# Patient Record
Sex: MALE | Race: WHITE | Employment: OTHER | URBAN - METROPOLITAN AREA
[De-identification: names, ages, dates, MRNs, and addresses within clinical notes are randomized per-mention and may not be internally consistent; named-entity substitution may affect disease eponyms.]

---

## 2018-01-02 LAB
BUN / CREAT RATIO: NORMAL
BUN BLDV-MCNC: NORMAL MG/DL
CREAT SERPL-MCNC: NORMAL MG/DL

## 2019-05-14 LAB
CREATININE, URINE: NORMAL
MICROALBUMIN/CREAT 24H UR: 35.8 MG/G{CREAT}
MICROALBUMIN/CREAT UR-RTO: NORMAL

## 2020-05-21 LAB
ALBUMIN SERPL-MCNC: NORMAL G/DL
ALP BLD-CCNC: NORMAL U/L
ALT SERPL-CCNC: NORMAL U/L
ANION GAP SERPL CALCULATED.3IONS-SCNC: NORMAL MMOL/L
AST SERPL-CCNC: NORMAL U/L
AVERAGE GLUCOSE: NORMAL
BILIRUB SERPL-MCNC: NORMAL MG/DL
BUN BLDV-MCNC: NORMAL MG/DL
CALCIUM SERPL-MCNC: NORMAL MG/DL
CHLORIDE BLD-SCNC: NORMAL MMOL/L
CHOLESTEROL, TOTAL: NORMAL
CHOLESTEROL/HDL RATIO: NORMAL
CO2: NORMAL
CREAT SERPL-MCNC: NORMAL MG/DL
GFR CALCULATED: NORMAL
GLUCOSE BLD-MCNC: NORMAL MG/DL
HBA1C MFR BLD: 9.2 %
HDLC SERPL-MCNC: NORMAL MG/DL
LDL CHOLESTEROL CALCULATED: NORMAL
MICROALBUMIN UR-MCNC: 50.3
POTASSIUM SERPL-SCNC: NORMAL MMOL/L
PROSTATE SPECIFIC ANTIGEN: NORMAL
SODIUM BLD-SCNC: NORMAL MMOL/L
TOTAL PROTEIN: NORMAL
TRIGL SERPL-MCNC: NORMAL MG/DL
VLDLC SERPL CALC-MCNC: NORMAL MG/DL

## 2020-07-13 VITALS
RESPIRATION RATE: 21 BRPM | SYSTOLIC BLOOD PRESSURE: 130 MMHG | HEART RATE: 92 BPM | TEMPERATURE: 98.3 F | WEIGHT: 305.5 LBS | HEIGHT: 70 IN | OXYGEN SATURATION: 91 % | DIASTOLIC BLOOD PRESSURE: 80 MMHG | BODY MASS INDEX: 43.74 KG/M2

## 2020-07-13 RX ORDER — ASPIRIN 81 MG/1
81 TABLET ORAL DAILY
COMMUNITY
End: 2021-04-14 | Stop reason: CLARIF

## 2020-07-13 RX ORDER — ATORVASTATIN CALCIUM 80 MG/1
80 TABLET, FILM COATED ORAL DAILY
COMMUNITY

## 2020-07-13 RX ORDER — INSULIN GLARGINE 100 [IU]/ML
10 INJECTION, SOLUTION SUBCUTANEOUS 2 TIMES DAILY
COMMUNITY
End: 2020-08-27

## 2020-07-13 RX ORDER — DILTIAZEM HYDROCHLORIDE 120 MG/1
120 CAPSULE, COATED, EXTENDED RELEASE ORAL DAILY
COMMUNITY

## 2020-07-13 RX ORDER — NAPROXEN SODIUM 220 MG
440 TABLET ORAL DAILY
COMMUNITY
End: 2021-04-14 | Stop reason: CLARIF

## 2020-07-13 RX ORDER — CARVEDILOL 25 MG/1
25 TABLET ORAL 2 TIMES DAILY
COMMUNITY

## 2020-07-13 RX ORDER — LISINOPRIL 20 MG/1
20 TABLET ORAL 2 TIMES DAILY
COMMUNITY

## 2020-07-13 RX ORDER — FUROSEMIDE 80 MG
40 TABLET ORAL 2 TIMES DAILY
COMMUNITY
End: 2021-04-14 | Stop reason: CLARIF

## 2020-07-22 ENCOUNTER — TELEPHONE (OUTPATIENT)
Dept: PHARMACY | Facility: CLINIC | Age: 70
End: 2020-07-22

## 2020-07-22 NOTE — LETTER
[address]  [address], [address]  Phone: [phone], [address] [place] [address]           07/23/20     Dear Yonis Blackmon,    We tried to reach you recently regarding your lisinopril, but were unable to reach you on the telephone. It appears that this medication has not been filled at proper times. We are worried you might be missing doses or not taking it as directed. It is important that you take your medications regularly and try not to miss a single dose. We have on file that you are currently taking lisinopril 20 mg twice daily. If you are no longer taking it or taking it differently than above, please call us at the number below so that we can discuss this and update your medication profile.     Some ways to help you remember to take and refill your medications are to:  · Use a pill box, set an alarm, and/or keep your medication near something that you do every day  · Fill a 3-month supply of your prescription at a time to save you time and trips to the pharmacy  if you would like assistance in switching your prescriptions to a 3-month supply, please contact us  · Ask your pharmacy if they participate in [facility]", a program where you can  all of your medications on the same day each month  · Ask your pharmacy if you can be set up with automatic refill, where they will automatically refill your prescription when it is due and let you know it's ready to     Sincerely,     Melina Mccauley, PharmD  [address]  Phone: [phone], [address]

## 2020-07-22 NOTE — TELEPHONE ENCOUNTER
CLINICAL PHARMACY: ADHERENCE REVIEW  Identified care gap per [employer]; fills at [facility]: ACE/ARB and Statin adherence    Last Office Visit: unknown - prescriber for both medications is Marlene Bose DO - cardiologist in [place], [place].    ASSESSMENT  ACE/ARB ADHERENCE  (YTD Dean Leahy = filled only once) Potential Fail Date: 2020    Per Insurance Records   Lisinopril 20 mg last filled on 2020 for a 90 day supply. SI tab BID. Per [facility]:   Lisinopril last picked up on 2020 for 90 day supply. There are refills remaining. BP Readings from Last 3 Encounters:   20 130/80     CrCl cannot be calculated (No successful lab value found. ). DIABETES ADHERENCE  PDC: no insurance claims to review - appears patient is prescribed Lantus 10 units qHS. (per scanned discharge instructions from 2020 - on 2020). Lab Results   Component Value Date    LABA1C 9.2 2020     STATIN ADHERENCE  (YTD PDC = 100%)     Per Insurance Records   Atorvastatin 80 mg last filled on 2020 for a 90 day supply. Due to refill on 2020. Per [facility]:   Atorvastatin last picked up on 2020 for 90 day supply. 2 refills remaining. PLAN  Attempting to reach patient to review.  Left message asking for return call. Will attempt to contact the patient again to discuss lisinopril adherence and to make sure he is taking BID as prescribed.      Selina Zabala, DelfinD, [facility]  Direct: [phone]  Department, toll free [phone], [address]

## 2020-08-19 ENCOUNTER — NURSE ONLY (OUTPATIENT)
Dept: FAMILY MEDICINE CLINIC | Age: 70
End: 2020-08-19
Payer: MEDICARE

## 2020-08-19 ENCOUNTER — HOSPITAL ENCOUNTER (OUTPATIENT)
Age: 70
Discharge: HOME | End: 2020-08-21
Payer: MEDICARE

## 2020-08-19 LAB
BASOPHILS ABSOLUTE: 0.1 E9/L (ref 0–0.2)
BASOPHILS RELATIVE PERCENT: 0.9 % (ref 0–2)
CREATININE URINE POCT: NORMAL
EOSINOPHILS ABSOLUTE: 0.43 E9/L (ref 0.05–0.5)
EOSINOPHILS RELATIVE PERCENT: 4.1 % (ref 0–6)
HCT VFR BLD CALC: 45.9 % (ref 37–54)
HEMOGLOBIN: 14.6 G/DL (ref 12.5–16.5)
IMMATURE GRANULOCYTES #: 0.02 E9/L
IMMATURE GRANULOCYTES %: 0.2 % (ref 0–5)
LYMPHOCYTES ABSOLUTE: 2.13 E9/L (ref 1.5–4)
LYMPHOCYTES RELATIVE PERCENT: 20.1 % (ref 20–42)
MCH RBC QN AUTO: 30.2 PG (ref 26–35)
MCHC RBC AUTO-ENTMCNC: 31.8 % (ref 32–34.5)
MCV RBC AUTO: 94.8 FL (ref 80–99.9)
MICROALBUMIN/CREAT 24H UR: NORMAL MG/G{CREAT}
MICROALBUMIN/CREAT UR-RTO: NORMAL
MONOCYTES ABSOLUTE: 0.81 E9/L (ref 0.1–0.95)
MONOCYTES RELATIVE PERCENT: 7.6 % (ref 2–12)
NEUTROPHILS ABSOLUTE: 7.12 E9/L (ref 1.8–7.3)
NEUTROPHILS RELATIVE PERCENT: 67.1 % (ref 43–80)
PDW BLD-RTO: 15.1 FL (ref 11.5–15)
PLATELET # BLD: 265 E9/L (ref 130–450)
PMV BLD AUTO: 11.6 FL (ref 7–12)
RBC # BLD: 4.84 E12/L (ref 3.8–5.8)
WBC # BLD: 10.6 E9/L (ref 4.5–11.5)

## 2020-08-19 PROCEDURE — 83036 HEMOGLOBIN GLYCOSYLATED A1C: CPT

## 2020-08-19 PROCEDURE — 85025 COMPLETE CBC W/AUTO DIFF WBC: CPT

## 2020-08-19 PROCEDURE — 36415 COLL VENOUS BLD VENIPUNCTURE: CPT | Performed by: NURSE PRACTITIONER

## 2020-08-19 PROCEDURE — 80053 COMPREHEN METABOLIC PANEL: CPT

## 2020-08-19 PROCEDURE — 82044 UR ALBUMIN SEMIQUANTITATIVE: CPT | Performed by: NURSE PRACTITIONER

## 2020-08-19 PROCEDURE — 80061 LIPID PANEL: CPT

## 2020-08-20 LAB
ALBUMIN SERPL-MCNC: 4.2 G/DL (ref 3.5–5.2)
ALP BLD-CCNC: 67 U/L (ref 40–129)
ALT SERPL-CCNC: 18 U/L (ref 0–40)
ANION GAP SERPL CALCULATED.3IONS-SCNC: 17 MMOL/L (ref 7–16)
AST SERPL-CCNC: 25 U/L (ref 0–39)
BILIRUB SERPL-MCNC: 0.5 MG/DL (ref 0–1.2)
BUN BLDV-MCNC: 24 MG/DL (ref 8–23)
CALCIUM SERPL-MCNC: 10 MG/DL (ref 8.6–10.2)
CHLORIDE BLD-SCNC: 103 MMOL/L (ref 98–107)
CHOLESTEROL, TOTAL: 127 MG/DL (ref 0–199)
CO2: 27 MMOL/L (ref 22–29)
CREAT SERPL-MCNC: 1.1 MG/DL (ref 0.7–1.2)
GFR AFRICAN AMERICAN: >60
GFR NON-AFRICAN AMERICAN: >60 ML/MIN/1.73
GLUCOSE BLD-MCNC: 116 MG/DL (ref 74–99)
HBA1C MFR BLD: 6.6 % (ref 4–5.6)
HDLC SERPL-MCNC: 30 MG/DL
LDL CHOLESTEROL CALCULATED: 65 MG/DL (ref 0–99)
POTASSIUM SERPL-SCNC: 5.2 MMOL/L (ref 3.5–5)
SODIUM BLD-SCNC: 147 MMOL/L (ref 132–146)
TOTAL PROTEIN: 7.1 G/DL (ref 6.4–8.3)
TRIGL SERPL-MCNC: 158 MG/DL (ref 0–149)
VLDLC SERPL CALC-MCNC: 32 MG/DL

## 2020-08-26 NOTE — TELEPHONE ENCOUNTER
Patient's [facility] pharmacy confirms lisinopril #90 filled on 7/23/20 has been picked up by patient.

## 2020-08-27 ENCOUNTER — OFFICE VISIT (OUTPATIENT)
Dept: FAMILY MEDICINE CLINIC | Age: 70
End: 2020-08-27
Payer: MEDICARE

## 2020-08-27 VITALS
HEART RATE: 92 BPM | SYSTOLIC BLOOD PRESSURE: 130 MMHG | DIASTOLIC BLOOD PRESSURE: 82 MMHG | WEIGHT: 288.7 LBS | OXYGEN SATURATION: 95 % | HEIGHT: 69 IN | BODY MASS INDEX: 42.76 KG/M2 | TEMPERATURE: 93.7 F

## 2020-08-27 PROCEDURE — 3288F FALL RISK ASSESSMENT DOCD: CPT | Performed by: NURSE PRACTITIONER

## 2020-08-27 PROCEDURE — 99214 OFFICE O/P EST MOD 30 MIN: CPT | Performed by: NURSE PRACTITIONER

## 2020-08-27 PROCEDURE — G8510 SCR DEP NEG, NO PLAN REQD: HCPCS | Performed by: NURSE PRACTITIONER

## 2020-08-27 ASSESSMENT — PATIENT HEALTH QUESTIONNAIRE - PHQ9
SUM OF ALL RESPONSES TO PHQ QUESTIONS 1-9: 1
SUM OF ALL RESPONSES TO PHQ9 QUESTIONS 1 & 2: 1
1. LITTLE INTEREST OR PLEASURE IN DOING THINGS: 1
SUM OF ALL RESPONSES TO PHQ QUESTIONS 1-9: 1
2. FEELING DOWN, DEPRESSED OR HOPELESS: 0

## 2020-08-27 NOTE — PROGRESS NOTES
20  [place] Notice : 1950 Sex: male  Age: 69 y.o. Chief Complaint   Patient presents with    Results     bloodwork, MA printed copy of labwork and gave to pt         CHRONIC CONDITION:    DM: Mild intensity but controlled on  metFORMIN (GLUCOPHAGE) 500 MG tablet, Take 500 mg by mouth 2 times daily, Disp: , Rfl: , remains without symptoms, no weight loss, no increase in thirst, or urination and no low blood sugar incidents. No reports of poor circulation or issues with feet ulceration or injury. Is better when compliant with diet and medications. Vision Exam Up to Date:                               Y      Currently on an ACE:                                    Y  Most Recent Hgb A1c <7:                             Y    6.6  AIC    Microalbumin Test Reviewed: Y   Foot Exam Up To Date:                                 2020    Flu Shot Up To Date:                                     Y   Has patient been seen by Neurology          N   Currently on a Statin                                    Y       HTN: Stable hypertension, Hx of CABG controlled on   rivaroxaban (XARELTO) 20 MG TABS tablet, Take 20 mg by mouth daily, Disp: , Rfl:   lisinopril (PRINIVIL;ZESTRIL) 20 MG tablet, Take 20 mg by mouth 2 times daily, Disp: , Rfl:   aspirin 81 MG EC tablet, Take 81 mg by mouth daily, Disp: , Rfl:   furosemide (LASIX) 40 MG tablet, Take 40 mg by mouth daily, Disp: , Rfl:   carvedilol (COREG) 25 MG tablet, Take 25 mg by mouth 2 times daily, Disp: , Rfl:   dilTIAZem (CARDIZEM CD) 120 MG extended release capsule, Take 120 mg by mouth daily, Disp: , Rfl: , remains at a mild intensity but overall good control, without symptoms, no ringing in the ears, no headaches and no nose bleeds. Better on medications. CAD and CHF: Patient currently on a Betablocker and an ACE for LVSD. Tobacco Use: N    Flu Shot Up To Date: N     Meds Listed in Medical Record:  Lor Perry Hyperlipidemia: Mild in intensity but controlled on  atorvastatin (LIPITOR) 80 MG tablet, Take 80 mg by mouth daily, Disp: , Rfl:  without symptoms, no complications with dietary treatment regimen reporting no side effects or intolereances. Compliant with treatment and diet. No muscle aches, new joint pains or abd pain.          Review of Systems      Current Outpatient Medications:     rivaroxaban (XARELTO) 20 MG TABS tablet, Take 20 mg by mouth daily, Disp: , Rfl:     lisinopril (PRINIVIL;ZESTRIL) 20 MG tablet, Take 20 mg by mouth 2 times daily, Disp: , Rfl:     aspirin 81 MG EC tablet, Take 81 mg by mouth daily, Disp: , Rfl:     Multiple Vitamins-Minerals (CENTRUM SILVER ADULT 50+ PO), Take 1 tablet by mouth daily, Disp: , Rfl:     naproxen sodium (ALEVE) 220 MG tablet, Take 440 mg by mouth daily, Disp: , Rfl:     furosemide (LASIX) 40 MG tablet, Take 40 mg by mouth daily, Disp: , Rfl:     carvedilol (COREG) 25 MG tablet, Take 25 mg by mouth 2 times daily, Disp: , Rfl:     atorvastatin (LIPITOR) 80 MG tablet, Take 80 mg by mouth daily, Disp: , Rfl:     dilTIAZem (CARDIZEM CD) 120 MG extended release capsule, Take 120 mg by mouth daily, Disp: , Rfl:     metFORMIN (GLUCOPHAGE) 500 MG tablet, Take 500 mg by mouth 2 times daily, Disp: , Rfl:   No Known Allergies    Past Medical History:   Diagnosis Date    CAD (coronary artery disease)     Diabetes mellitus [facility] [address]     Hyperlipidemia     Hypertension     Myocardial infarction [facility])      Past Surgical History:   Procedure Laterality Date    CATARACT REMOVAL Bilateral     CORONARY ARTERY BYPASS GRAFT  12/01/1997    KNEE ARTHROSCOPY Bilateral 09/02/2016     Family History   Problem Relation Age of Onset    Cancer Mother         STOMACH    Heart Attack Father     Coronary Art Dis Father     High Blood Pressure Father     High Cholesterol Father     Other Father         CHF    Heart Attack Brother     Cancer Brother         LIVER    Coronary Art Dis Brother     High Blood Pressure Brother     Stroke Brother     High Cholesterol Brother     Other Brother         CHF     Social History     Socioeconomic History    Marital status:      Spouse name: Not on file    Number of children: Not on file    Years of education: Not on file    Highest education level: Not on file   Occupational History    Not on file   Social Needs    Financial resource strain: Not on file    Food insecurity     Worry: Not on file     Inability: Not on file   [employer] needs     Medical: Not on file     Non-medical: Not on file   Tobacco Use    Smoking status: Former Smoker    Smokeless tobacco: Never Used   Substance and Sexual Activity    Alcohol use: Yes     Comment: BEER OCCASIONALLY    Drug use: Never    Sexual activity: Not on file   Lifestyle    Physical activity     Days per week: Not on file     Minutes per session: Not on file    Stress: Not on file   Relationships    Social connections     Talks on phone: Not on file     Gets together: Not on file     Attends Evangelical service: Not on file     Active member of club or organization: Not on file     Attends meetings of clubs or organizations: Not on file     Relationship status: Not on file    Intimate partner violence     Fear of current or ex partner: Not on file     Emotionally abused: Not on file     Physically abused: Not on file     Forced sexual activity: Not on file   Other Topics Concern    Not on file   Social History Narrative    Not on file       Vitals:    08/27/20 1318   BP: 130/82   Site: Right Upper Arm   Position: Sitting   Cuff Size: Large Adult   Pulse: 92   Temp: 93.7 °F (34.3 °C)   TempSrc: Temporal   SpO2: 95%   Weight: 288 lb 11.2 oz (131 kg)   Height: 5' 9\" (1.753 m)       Physical Exam    Assessment and Plan:  Adama Lorenzana was seen today for results.     Diagnoses and all orders for this visit:    BMI 40.0-44.9, adult ([facility] [address]    ST elevation myocardial infarction involving left anterior descending (LAD) coronary artery (HCC)    Essential hypertension    Familial hypercholesterolemia    Type 2 diabetes mellitus without complication, without long-term current use of insulin (HCC)    Coronary artery disease with angina pectoris, unspecified vessel or lesion type, unspecified whether native or transplanted heart ([facility] [address]        No follow-ups on file. USPTF:    (2015 Colorectal Cancer: Screening --Adults aged 48 to 76 years  Grade: A (Recommended) recommends screening for colorectal cancer starting at age 48 years and continuing until age 76 years.     (130/82 B/P) High Blood Pressure: Screening and Home Monitoring -- Adults  Grade: A (Recommended) recommends screening for high blood pressure in ages 25 years or older. obtain measurements outside of the clinical setting for diagnostic confirmation before starting treatment. Annual screening for adults aged 36 years or older or those who are at increased risk for blood pressure    (Trig up but doing good)  Lipid Disorders in Adults: Screening -- Men 28 and Older  Grade: A (Recommended) recommends screening men aged 28 and older for lipid disorders. (Social) Alcohol Misuse: Screening and Behavioral Counseling Interventions in Primary Care -- Adults  Grade: B (Recommended) recommends that clinicians screen adults aged 25 years or older for alcohol misuse and provide persons engaged in risky or hazardous drinking with brief behavioral counseling interventions to reduce alcohol misuse. (AIC 6.6) Abnormal Blood Glucose and Type 2 Diabetes Mellitus: Screening -- Adults aged 36 to 79 years who are overweight or obese Grade: B (Recommended)    (BMI 42.63)  Obesity: Screening for and Management of-- All Adults  Grade: B(Recommended) recommends screening all adults for obesity. Clinicians should offer or refer patients with a body mass index (BMI) of 30 kg/m2 or higher to intensive, multicomponent behavioral interventions.         (Steady Gait)  Fall Prevention -- Exercise/Physical Therapy: Community-dwelling Adults 72 Years or Older, Increased Risk for Falls   Grade: B (Recommended) recommends exercise or physical therapy to prevent falls in community-dwelling adults aged 72 years or older who are at increased risk for falls. (Negative today)  Depression: Screening -- General adult population, including pregnant and postpartum women  Grade: B(Recommended) recommends screening for depression in the general adult population,  Screening should be implemented with adequate systems in place to ensure accurate diagnosis, effective treatment, and appropriate follow-up.     (2019) Glaucoma: Screening - Adults and Diabetic Eye Exam     (2020) Coronary Heart Disease: Screening with Electrocardiography--Adults at Low Risk  Grade: D (Not Recommended)     (  ) Prostate Cancer: Prostate-Specific Antigen (PSA)-Based Screening -- All Men   PSA  2.0  May 2020    Patient is on 500 mg 2 po bid will change him over to 1000 mg bid when he needs refills        Seen By:  HENRRY Faye - CNP

## 2020-09-29 ENCOUNTER — TELEPHONE (OUTPATIENT)
Dept: FAMILY MEDICINE CLINIC | Age: 70
End: 2020-09-29

## 2020-09-30 ENCOUNTER — OFFICE VISIT (OUTPATIENT)
Dept: FAMILY MEDICINE CLINIC | Age: 70
End: 2020-09-30
Payer: MEDICARE

## 2020-09-30 ENCOUNTER — HOSPITAL ENCOUNTER (OUTPATIENT)
Age: 70
Discharge: HOME | End: 2020-10-02
Payer: MEDICARE

## 2020-09-30 VITALS
SYSTOLIC BLOOD PRESSURE: 118 MMHG | WEIGHT: 267.8 LBS | DIASTOLIC BLOOD PRESSURE: 66 MMHG | TEMPERATURE: 96.4 F | HEART RATE: 101 BPM | BODY MASS INDEX: 39.55 KG/M2 | OXYGEN SATURATION: 91 %

## 2020-09-30 PROCEDURE — 36415 COLL VENOUS BLD VENIPUNCTURE: CPT | Performed by: NURSE PRACTITIONER

## 2020-09-30 PROCEDURE — 1111F DSCHRG MED/CURRENT MED MERGE: CPT | Performed by: NURSE PRACTITIONER

## 2020-09-30 PROCEDURE — 99215 OFFICE O/P EST HI 40 MIN: CPT | Performed by: NURSE PRACTITIONER

## 2020-09-30 PROCEDURE — 85025 COMPLETE CBC W/AUTO DIFF WBC: CPT

## 2020-09-30 PROCEDURE — 80053 COMPREHEN METABOLIC PANEL: CPT

## 2020-09-30 RX ORDER — CLOPIDOGREL BISULFATE 75 MG/1
1 TABLET ORAL DAILY
COMMUNITY
Start: 2020-09-25 | End: 2022-01-01

## 2020-09-30 RX ORDER — POTASSIUM CHLORIDE 20 MEQ/1
1 TABLET, EXTENDED RELEASE ORAL 2 TIMES DAILY
COMMUNITY
Start: 2020-09-25 | End: 2020-11-11

## 2020-09-30 ASSESSMENT — ENCOUNTER SYMPTOMS
TROUBLE SWALLOWING: 0
CONSTIPATION: 0
SHORTNESS OF BREATH: 0
VOICE CHANGE: 0
BACK PAIN: 0
VOMITING: 0
DIARRHEA: 0
COUGH: 0
BLOOD IN STOOL: 0
ABDOMINAL PAIN: 0
WHEEZING: 0
NAUSEA: 0
CHEST TIGHTNESS: 0

## 2020-09-30 NOTE — PATIENT INSTRUCTIONS
Patient Education        Heart Failure: Care Instructions  Your Care Instructions     Heart failure occurs when your heart does not pump as much blood as the body needs. Failure does not mean that the heart has stopped pumping but rather that it is not pumping as well as it should. Over time, this causes fluid buildup in your lungs and other parts of your body. Fluid buildup can cause shortness of breath, fatigue, swollen ankles, and other problems. By taking medicines regularly, reducing sodium (salt) in your diet, checking your weight every day, and making lifestyle changes, you can feel better and live longer. Follow-up care is a key part of your treatment and safety. Be sure to make and go to all appointments, and call your doctor if you are having problems. It's also a good idea to know your test results and keep a list of the medicines you take. How can you care for yourself at home? Medicines  · Be safe with medicines. Take your medicines exactly as prescribed. Call your doctor if you think you are having a problem with your medicine. · Do not take any vitamins, over-the-counter medicine, or herbal products without talking to your doctor first. Verlon Diver not take ibuprofen (Advil or Motrin) and naproxen (Aleve) without talking to your doctor first. They could make your heart failure worse. · You may take some of the following medicine. ? Angiotensin-converting enzyme inhibitors (ACEIs) or angiotensin II receptor blockers (ARBs) reduce the heart's workload, lower blood pressure, and reduce swelling. Taking an ACEI or ARB may lower your chance of needing to be hospitalized. ? Beta-blockers can slow heart rate, decrease blood pressure, and improve your condition. Taking a beta-blocker may lower your chance of needing to be hospitalized. ? Diuretics, also called water pills, reduce swelling. You will get more details on the specific medicines your doctor prescribes.   Diet  · Your doctor may suggest that you limit sodium. Your doctor can tell you how much sodium is right for you. An example is less than 3,000 mg a day. This includes all the salt you eat in cooking or in packaged foods. People get most of their sodium from processed foods. Fast food and restaurant meals also tend to be very high in sodium. · Ask your doctor how much liquid you can drink each day. You may have to limit liquids. Weight  · Weigh yourself without clothing at the same time each day. Record your weight. Call your doctor if you have a sudden weight gain, such as more than 2 to 3 pounds in a day or 5 pounds in a week. (Your doctor may suggest a different range of weight gain.) A sudden weight gain may mean that your heart failure is getting worse. Activity level  · Start light exercise (if your doctor says it is okay). Even if you can only do a small amount, exercise will help you get stronger, have more energy, and manage your weight and your stress. Walking is an easy way to get exercise. Start out by walking a little more than you did before. Bit by bit, increase the amount you walk. · When you exercise, watch for signs that your heart is working too hard. You are pushing yourself too hard if you cannot talk while you are exercising. If you become short of breath or dizzy or have chest pain, stop, sit down, and rest.  · If you feel \"wiped out\" the day after you exercise, walk slower or for a shorter distance until you can work up to a better pace. · Get enough rest at night. Sleeping with 1 or 2 pillows under your upper body and head may help you breathe easier. Lifestyle changes  · Do not smoke. Smoking can make a heart condition worse. If you need help quitting, talk to your doctor about stop-smoking programs and medicines. These can increase your chances of quitting for good. Quitting smoking may be the most important step you can take to protect your heart. · Limit alcohol to 2 drinks a day for men and 1 drink a day for women.  Too heart. These medicines can help you feel better and live longer. It is important to take all your medicines exactly as prescribed to get the best results. They can also cause side effects. If you think that any of your medicines are causing side effects, talk with your doctor. Follow-up care is a key part of your treatment and safety. Be sure to make and go to all appointments. Call your doctor if you are having problems. It's also a good idea to know your test results and keep a list of the medicines you take. What medicines are used for heart failure? · Aldosterone receptor antagonists. These are a type of diuretic. They make the kidneys get rid of extra fluid. · Angiotensin-converting enzyme (ACE) inhibitors help blood flow. They relax the blood vessels and lower your blood pressure. The heart can then pump more blood through your body without working harder. These include benazepril and lisinopril. · Angiotensin II receptor blockers (ARBs) work like ACE inhibitors. Some people use them instead. They include losartan. · Angiotensin receptor neprilysin inhibitor (ARNI) medicine works like ARBs and ACE inhibitors. Some people take an ARNI medicine instead. An example is sacubitril/valsartan. · Beta-blockers can slow the heart rate and decrease blood pressure. They can help heart failure. They include bisoprolol, carvedilol, and metoprolol. · Digoxin relieves symptoms in some people with heart failure. · Diuretics reduce swelling. They do this by helping the kidneys get rid of excess fluid. They are also called water pills. · Hydralazine. This may be taken with a nitrate to widen blood vessels. It can lower blood pressure and reduce the workload on the heart. · Ivabradine slows the heart rate. What should you know about these medicines? This is not a complete list of medicines used for heart failure.  If you have questions about your medicine, ask your doctor or pharmacist.  ACE inhibitors  Before you start to take an ACE inhibitor, talk to your doctor. Tell him or her if:  · You take any anti-inflammatory medicines. These include ibuprofen (Advil, Motrin) and naproxen (Aleve). · You take antacids, potassium pills or a salt substitute, or lithium. · You take a diuretic. · You are pregnant or breastfeeding or you plan to get pregnant. · You have kidney disease. Side effects include:  · A dry cough. If the cough is bad enough to make you stop the medicine, talk to your doctor. You may need to take a different one. · Feeling lightheaded. This may happen when you stand up too fast. It usually gets better with time. Angiotensin II receptor blockers (ARBs)  Before you start to take an ARB, talk to your doctor. Tell him or her if:  · You take any anti-inflammatory medicines. These include ibuprofen (Advil, Motrin) and naproxen (Aleve). · You take antacids, potassium pills or a salt substitute, or lithium. · You have kidney disease. · You take a diuretic. · You are pregnant or breastfeeding or you plan to get pregnant. Side effects include:  · Feeling lightheaded or dizzy. These are the most common side effects. Angiotensin receptor neprilysin inhibitor (ARNI)  Before you start to take an ARNI, talk to your doctor. Tell him or her if:  · You take any anti-inflammatory medicines. These include ibuprofen (Advil, Motrin) and naproxen (Aleve). · You take antacids, potassium pills or a salt substitute, or lithium. · You take an ACE inhibitor or an ARB. · You have kidney or liver problems. · You take a diuretic. · You are pregnant or breastfeeding or you plan to get pregnant. Side effects include:  · Feeling lightheaded or dizzy. These are the most common side effects. Diuretics (water pills)  Before you start to take a diuretic, talk to your doctor. Tell him or her if:  · You take lithium or anti-inflammatory medicines such as Advil or Aleve. Side effects include:  · Urinating often.  Ask your doctor about timing these pills so that you don't have to use the bathroom at a bad time. · Muscle cramps. This may mean that you are losing too much potassium. It is an important mineral. Call your doctor if you get muscle cramps. · Tender breasts. This may occur in men who take spironolactone. Call your doctor if you get tender breasts that bother you. Beta-blockers  Before you start to take a beta-blocker, talk to your doctor. Tell him or her if:  · You have asthma or diabetes. Side effects include:  · Feeling dizzy or tired. This usually gets better with time. Digoxin  Before you start to take digoxin, talk to your doctor. Tell him or her if:  · You have kidney disease. · You take a diuretic or other medicines. This includes over-the-counter medicines. Side effects include:  · Nausea or vomiting. · Confusion, changes in vision, a racing or slowed heartbeat, or dizziness. Call your doctor right away if you have any of these side effects. Where can you learn more? Go to https://MiniBanda.ru.iJigg.com. org and sign in to your [employer] account. Enter F132 in the adaffix box to learn more about \"Medicines for Heart Failure: Care Instructions. \"     If you do not have an account, please click on the \"Sign Up Now\" link. Current as of: December 16, 2019               Content Version: 12.5  © 1333-9643 [employer], [employer]. Care instructions adapted under license by [facility] ([facility]). If you have questions about a medical condition or this instruction, always ask your healthcare professional. [place] [address] any warranty or liability for your use of this information.

## 2020-09-30 NOTE — PROGRESS NOTES
20  Arnie Champagne : 1950 Sex: male  Age: 69 y.o. Chief Complaint   Patient presents with    Follow-Up from Hospital     Pt was at [facility] admitted for 6 days, discharged last Thursday, pt had fluid on his lungs         CHRONIC CONDITION:    DM: Mild intensity but controlled on  metFORMIN (GLUCOPHAGE) 500 MG tablet, Take 500 mg by mouth 2 times daily, Disp: , Rfl: , remains without symptoms, no weight loss, no increase in thirst, or urination and no low blood sugar incidents. No reports of poor circulation or issues with feet ulceration or injury. Is better when compliant with diet and medications. Vision Exam Up to Date:                               Y      Currently on an ACE:                                    Y  Most Recent Hgb A1c <7:                             Y    6.6  AIC    Microalbumin Test Reviewed: Y   Foot Exam Up To Date:                                 2020    Flu Shot Up To Date:                                     Y   Has patient been seen by Neurology          N   Currently on a Statin                                    Y       HTN: Stable hypertension, Hx of CABG controlled on   clopidogrel (PLAVIX) 75 MG tablet, Take 1 tablet by mouth daily, Disp: , Rfl:   rivaroxaban (XARELTO) 20 MG TABS tablet, Take 20 mg by mouth daily, Disp: , Rfl:   lisinopril (PRINIVIL;ZESTRIL) 20 MG tablet, Take 20 mg by mouth 2 times daily, Disp: , Rfl:   aspirin 81 MG EC tablet, Take 81 mg by mouth daily, Disp: , Rfl:   furosemide (LASIX) 80 MG tablet, Take 40 mg by mouth 2 times daily , Disp: , Rfl:   carvedilol (COREG) 25 MG tablet, Take 25 mg by mouth 2 times daily, Disp: , Rfl:   dilTIAZem (CARDIZEM CD) 120 MG extended release capsule, Take 120 mg by mouth daily, Disp: , Rfl: , remains at a mild intensity but overall good control, without symptoms, no ringing in the ears, no headaches and no nose bleeds. Better on medications.     CAD and CHF: Patient currently on a Betablocker and an ACE for LVSD. Tobacco Use: N    Flu Shot Up To Date: N     Meds Listed in Medical Record: Y     Hyperlipidemia: Mild in intensity but controlled on  atorvastatin (LIPITOR) 80 MG tablet, Take 80 mg by mouth daily, Disp: , Rfl:  without symptoms, no complications with dietary treatment regimen reporting no side effects or intolereances. Compliant with treatment and diet. No muscle aches, new joint pains or abd pain. Review of Systems   Constitutional: Negative for activity change, chills, diaphoresis, fatigue, fever and unexpected weight change. HENT: Negative for trouble swallowing and voice change. Eyes: Negative for visual disturbance. Respiratory: Negative for cough, chest tightness, shortness of breath and wheezing. Cardiovascular: Negative for chest pain, palpitations and leg swelling. Gastrointestinal: Negative for abdominal pain, blood in stool, constipation, diarrhea, nausea and vomiting. Endocrine: Negative for polydipsia, polyphagia and polyuria. Genitourinary: Negative for dysuria, enuresis, frequency and hematuria. Musculoskeletal: Negative for arthralgias, back pain, gait problem, joint swelling, myalgias and neck stiffness. Skin: Negative for rash. Neurological: Negative for dizziness, seizures, syncope, facial asymmetry, weakness, light-headedness, numbness and headaches. Hematological: Does not bruise/bleed easily. Psychiatric/Behavioral: Negative for behavioral problems, confusion, hallucinations and suicidal ideas. The patient is not nervous/anxious.           Current Outpatient Medications:     clopidogrel (PLAVIX) 75 MG tablet, Take 1 tablet by mouth daily, Disp: , Rfl:     potassium chloride (KLOR-CON M) 20 MEQ extended release tablet, Take 1 tablet by mouth 2 times daily, Disp: , Rfl:     rivaroxaban (XARELTO) 20 MG TABS tablet, Take 20 mg by mouth daily, Disp: , Rfl:     lisinopril (PRINIVIL;ZESTRIL) 20 MG Heart sounds: Normal heart sounds. No murmur. No friction rub. No gallop. Pulmonary:      Effort: Pulmonary effort is normal. No respiratory distress. Breath sounds: Normal breath sounds. No stridor. No wheezing, rhonchi or rales. Chest:      Chest wall: No tenderness. Abdominal:      General: Bowel sounds are normal. There is no distension. Palpations: Abdomen is soft. Musculoskeletal:         General: No swelling, tenderness, deformity or signs of injury. Right lower leg: No edema. Left lower leg: No edema. Lymphadenopathy:      Cervical: No cervical adenopathy. Skin:     General: Skin is warm and dry. Capillary Refill: Capillary refill takes 2 to 3 seconds. Findings: No bruising, lesion or rash. Neurological:      General: No focal deficit present. Mental Status: He is alert and oriented to person, place, and time. Motor: No weakness. Gait: Gait normal.   Psychiatric:         Attention and Perception: Attention normal.         Mood and Affect: Mood normal.         Behavior: Behavior normal.         Thought Content: Thought content does not include homicidal or suicidal ideation. Thought content does not include homicidal or suicidal plan. Assessment and Plan:  Romina Jarquin was seen today for follow-up from hospital.    Diagnoses and all orders for this visit:    Type 2 diabetes mellitus without complication, without long-term current use of insulin ([facility] [address]  -     CBC Auto Differential; Future  -     Comprehensive Metabolic Panel;  Future  -     [place] DISCHARGE MEDS RECONCILED W/ CURRENT OUTPATIENT MED LIST    Chronic right-sided congestive heart failure (HCC)  -     [place] DISCHARGE MEDS RECONCILED W/ CURRENT OUTPATIENT MED LIST    Coronary artery disease with angina pectoris, unspecified vessel or lesion type, unspecified whether native or transplanted heart (HCC)    BMI 40.0-44.9, adult [facility])    Essential hypertension    Familial hypercholesterolemia    Acute right-sided congestive heart failure ([facility] [address]        Return in 1 month (on 10/30/2020). USPTF:    (2015 Colorectal Cancer: Screening --Adults aged 48 to 76 years  Grade: A (Recommended) recommends screening for colorectal cancer starting at age 48 years and continuing until age 76 years.     (130/82 B/P) High Blood Pressure: Screening and Home Monitoring -- Adults  Grade: A (Recommended) recommends screening for high blood pressure in ages 25 years or older. obtain measurements outside of the clinical setting for diagnostic confirmation before starting treatment. Annual screening for adults aged 36 years or older or those who are at increased risk for blood pressure    (Trig up but doing good)  Lipid Disorders in Adults: Screening -- Men 28 and Older  Grade: A (Recommended) recommends screening men aged 28 and older for lipid disorders. (Social) Alcohol Misuse: Screening and Behavioral Counseling Interventions in Primary Care -- Adults  Grade: B (Recommended) recommends that clinicians screen adults aged 25 years or older for alcohol misuse and provide persons engaged in risky or hazardous drinking with brief behavioral counseling interventions to reduce alcohol misuse. (AIC 6.6) Abnormal Blood Glucose and Type 2 Diabetes Mellitus: Screening -- Adults aged 36 to 79 years who are overweight or obese Grade: B (Recommended)    (BMI 42.63)  Obesity: Screening for and Management of-- All Adults  Grade: B(Recommended) recommends screening all adults for obesity. Clinicians should offer or refer patients with a body mass index (BMI) of 30 kg/m2 or higher to intensive, multicomponent behavioral interventions.         (Steady Gait)  Fall Prevention -- Exercise/Physical Therapy: Community-dwelling Adults 72 Years or Older, Increased Risk for Falls   Grade: B (Recommended) recommends exercise or physical therapy to prevent falls in community-dwelling adults aged 72 years or older who are hospital  As documented on this visit    Medications marked \"taking\" at this time  Outpatient Medications Marked as Taking for the 9/30/20 encounter (Office Visit) with HENRRY Jesus - CNP   Medication Sig Dispense Refill    clopidogrel (PLAVIX) 75 MG tablet Take 1 tablet by mouth daily      potassium chloride (KLOR-CON M) 20 MEQ extended release tablet Take 1 tablet by mouth 2 times daily      rivaroxaban (XARELTO) 20 MG TABS tablet Take 20 mg by mouth daily      lisinopril (PRINIVIL;ZESTRIL) 20 MG tablet Take 20 mg by mouth 2 times daily      aspirin 81 MG EC tablet Take 81 mg by mouth daily      Multiple Vitamins-Minerals (CENTRUM SILVER ADULT 50+ PO) Take 1 tablet by mouth daily      naproxen sodium (ALEVE) 220 MG tablet Take 440 mg by mouth daily      furosemide (LASIX) 80 MG tablet Take 40 mg by mouth 2 times daily       carvedilol (COREG) 25 MG tablet Take 25 mg by mouth 2 times daily      atorvastatin (LIPITOR) 80 MG tablet Take 80 mg by mouth daily      dilTIAZem (CARDIZEM CD) 120 MG extended release capsule Take 120 mg by mouth daily      metFORMIN (GLUCOPHAGE) 500 MG tablet Take 1,000 mg by mouth 2 times daily           Medications patient taking as of now reconciled against medications ordered at time of hospital discharge: Yes    Chief Complaint   Patient presents with    Follow-Up from Hospital     Pt was at [facility] admitted for 6 days, discharged last Thursday, pt had fluid on his lungs       History of Present illness - Follow up of Hospital diagnosis(es): CHF with a new stent placement    Inpatient course: Discharge summary reviewed- see chart.     Interval history/Current status: feeling better Lasix was increased and the potassium was added along with the Plavix    Vitals:    09/30/20 1306   BP: 118/66   Site: Left Upper Arm   Position: Sitting   Cuff Size: Medium Adult   Pulse: 101   Temp: 96.4 °F (35.8 °C)   TempSrc: Temporal   SpO2: 91%   Weight: 267 lb 12.8 oz (121.5 kg)     Body mass index is 39.55 kg/m². Wt Readings from Last 3 Encounters:   09/30/20 267 lb 12.8 oz (121.5 kg)   08/27/20 288 lb 11.2 oz (131 kg)   05/26/20 (!) 305 lb 8 oz (138.6 kg)     BP Readings from Last 3 Encounters:   09/30/20 118/66   08/27/20 130/82   05/26/20 130/80       A comprehensive review of systems was negative. Physical Exam:  General Appearance: alert and oriented to person, place and time, well developed and well- nourished, in no acute distress  Skin: warm and dry, no rash or erythema  Head: normocephalic and atraumatic  Eyes: pupils equal, round, and reactive to light, extraocular eye movements intact, conjunctivae normal  ENT: tympanic membrane, external ear and ear canal normal bilaterally, nose without deformity, nasal mucosa and turbinates normal without polyps  Neck: supple and non-tender without mass, no thyromegaly or thyroid nodules, no cervical lymphadenopathy  Pulmonary/Chest: clear to auscultation bilaterally- no wheezes, rales or rhonchi, normal air movement, no respiratory distress  Cardiovascular: normal rate, regular rhythm, normal S1 and S2, no murmurs, rubs, clicks, or gallops, distal pulses intact, no carotid bruits  Abdomen: soft, non-tender, non-distended, normal bowel sounds, no masses or organomegaly  Extremities: no cyanosis, clubbing or edema  Musculoskeletal: normal range of motion, no joint swelling, deformity or tenderness  Neurologic: reflexes normal and symmetric, no cranial nerve deficit, gait, coordination and speech normal    Assessment/Plan:  1. Type 2 diabetes mellitus without complication, without long-term current use of insulin (HCC)     - CBC Auto Differential; Future  - Comprehensive Metabolic Panel; Future  - [place] DISCHARGE MEDS RECONCILED W/ CURRENT OUTPATIENT MED LIST    2. Chronic right-sided congestive heart failure (HCC)  Lasix 80 mg up from 40 mg and K added as well   - [place] DISCHARGE MEDS RECONCILED W/ CURRENT OUTPATIENT MED LIST    3. Coronary artery disease with angina pectoris, unspecified vessel or lesion type, unspecified whether native or transplanted heart ([facility] [address]  Controlled    4. BMI 40.0-44.9, adult [facility])  Patient is loosing weight     5. Essential hypertension  Getting some low number and getting light headed  At times with all the blood thinners advised to avoid any falls     6. Familial hypercholesterolemia  Will continue expectant management    7.  Acute right-sided congestive heart failure (HCC)  Continue Lasix 80 mg till I have a reason not to due to renal function continue the potassium        Medical Decision Making: moderate complexity

## 2020-10-01 ENCOUNTER — CLINICAL DOCUMENTATION (OUTPATIENT)
Dept: FAMILY MEDICINE CLINIC | Age: 70
End: 2020-10-01

## 2020-10-01 LAB
ALBUMIN SERPL-MCNC: 4.2 G/DL (ref 3.5–5.2)
ALP BLD-CCNC: 74 U/L (ref 40–129)
ALT SERPL-CCNC: 19 U/L (ref 0–40)
ANION GAP SERPL CALCULATED.3IONS-SCNC: 19 MMOL/L (ref 7–16)
AST SERPL-CCNC: 24 U/L (ref 0–39)
BASOPHILS ABSOLUTE: 0.09 E9/L (ref 0–0.2)
BASOPHILS RELATIVE PERCENT: 0.6 % (ref 0–2)
BILIRUB SERPL-MCNC: 0.5 MG/DL (ref 0–1.2)
BUN BLDV-MCNC: 35 MG/DL (ref 8–23)
CALCIUM SERPL-MCNC: 10.1 MG/DL (ref 8.6–10.2)
CHLORIDE BLD-SCNC: 99 MMOL/L (ref 98–107)
CO2: 24 MMOL/L (ref 22–29)
CREAT SERPL-MCNC: 1.3 MG/DL (ref 0.7–1.2)
EOSINOPHILS ABSOLUTE: 0.31 E9/L (ref 0.05–0.5)
EOSINOPHILS RELATIVE PERCENT: 2.1 % (ref 0–6)
GFR AFRICAN AMERICAN: >60
GFR NON-AFRICAN AMERICAN: 54 ML/MIN/1.73
GLUCOSE BLD-MCNC: 108 MG/DL (ref 74–99)
HCT VFR BLD CALC: 45.6 % (ref 37–54)
HEMOGLOBIN: 14.6 G/DL (ref 12.5–16.5)
IMMATURE GRANULOCYTES #: 0.05 E9/L
IMMATURE GRANULOCYTES %: 0.3 % (ref 0–5)
LYMPHOCYTES ABSOLUTE: 2.47 E9/L (ref 1.5–4)
LYMPHOCYTES RELATIVE PERCENT: 17.1 % (ref 20–42)
MCH RBC QN AUTO: 29.4 PG (ref 26–35)
MCHC RBC AUTO-ENTMCNC: 32 % (ref 32–34.5)
MCV RBC AUTO: 91.9 FL (ref 80–99.9)
MONOCYTES ABSOLUTE: 0.95 E9/L (ref 0.1–0.95)
MONOCYTES RELATIVE PERCENT: 6.6 % (ref 2–12)
NEUTROPHILS ABSOLUTE: 10.58 E9/L (ref 1.8–7.3)
NEUTROPHILS RELATIVE PERCENT: 73.3 % (ref 43–80)
PDW BLD-RTO: 15.2 FL (ref 11.5–15)
PLATELET # BLD: 320 E9/L (ref 130–450)
PMV BLD AUTO: 11.2 FL (ref 7–12)
POTASSIUM SERPL-SCNC: 4.4 MMOL/L (ref 3.5–5)
RBC # BLD: 4.96 E12/L (ref 3.8–5.8)
SODIUM BLD-SCNC: 142 MMOL/L (ref 132–146)
TOTAL PROTEIN: 7.2 G/DL (ref 6.4–8.3)
WBC # BLD: 14.5 E9/L (ref 4.5–11.5)

## 2020-10-22 ENCOUNTER — TELEPHONE (OUTPATIENT)
Dept: PHARMACY | Facility: CLINIC | Age: 70
End: 2020-10-22

## 2020-10-22 NOTE — TELEPHONE ENCOUNTER
CLINICAL PHARMACY: ADHERENCE REVIEW  Identified care gap per [employer]; fills at [facility]: Statin adherence    Last Office Visit: 9/30/20    Patient also appears to be taking: Atorvastatin 80mg,  Lisinopril 20mg, metformin 500mg     ASSESSMENT  ACE/ARB ADHERENCE  PDC: 74%    Per [facility]  Pharmacy   Lisinopril last filled on 10/18/20 for a 90 day supply. 1 refills remaining. Billed through [employer]     BP Readings from Last 3 Encounters:   09/30/20 118/66   08/27/20 130/82   05/26/20 130/80     Estimated Creatinine Clearance: 67 mL/min (A) (based on SCr of 1.3 mg/dL (H)). DIABETES ADHERENCE     Metformin last filled on 10/10 for a 90 day supply. 0 refills remaining. Billed through [employer]   Component Value Date    LABA1C 6.6 (H) 08/19/2020    LABA1C 9.2 05/21/2020       STATIN ADHERENCE    Atorvastatin last filled on 9/17/20 for a 30 day supply. 1 refills remaining. Billed through [employer]   Component Value Date    CHOL 127 08/19/2020    TRIG 158 (H) 08/19/2020    HDL 30 08/19/2020    LDLCALC 65 08/19/2020     ALT   Date Value Ref Range Status   09/30/2020 19 0 - 40 U/L Final     AST   Date Value Ref Range Status   09/30/2020 24 0 - 39 U/L Final     The ASCVD Risk score (Rober Garner, et al., 2013) failed to calculate for the following reasons: The patient has a prior MI or stroke diagnosis     PLAN  No patient out reach planned at this time. Patient appears to Be adherent and filling a 90ds for each medication    CLINICAL PHARMACY CONSULT: MED RECONCILIATION/REVIEW ADDENDUM    For Pharmacy Admin Tracking Only    PHSO: Yes  Total # of Interventions Recommended: 3  - New Order #: 0 New Medication Order Reason(s):  Adherence  - Maintenance Safety Lab Monitoring #: 1  - New Therapy Lab Monitoring #: 0  Recommended intervention potential cost savings: 0  Total Interventions Accepted: 0  Time Spent (min): Arielle Blackburn 7

## 2020-11-09 ENCOUNTER — NURSE ONLY (OUTPATIENT)
Dept: FAMILY MEDICINE CLINIC | Age: 70
End: 2020-11-09
Payer: MEDICARE

## 2020-11-09 VITALS — TEMPERATURE: 97.7 F

## 2020-11-09 DIAGNOSIS — I10 ESSENTIAL HYPERTENSION: ICD-10-CM

## 2020-11-09 DIAGNOSIS — E11.9 TYPE 2 DIABETES MELLITUS WITHOUT COMPLICATION, WITHOUT LONG-TERM CURRENT USE OF INSULIN (HCC): ICD-10-CM

## 2020-11-09 DIAGNOSIS — E78.01 FAMILIAL HYPERCHOLESTEROLEMIA: ICD-10-CM

## 2020-11-09 DIAGNOSIS — Z13.29 SCREENING FOR THYROID DISORDER: ICD-10-CM

## 2020-11-09 LAB
ALBUMIN SERPL-MCNC: 4.1 G/DL (ref 3.5–5.2)
ALP BLD-CCNC: 72 U/L (ref 40–129)
ALT SERPL-CCNC: 14 U/L (ref 0–40)
ANION GAP SERPL CALCULATED.3IONS-SCNC: 18 MMOL/L (ref 7–16)
AST SERPL-CCNC: 23 U/L (ref 0–39)
BASOPHILS ABSOLUTE: 0.08 E9/L (ref 0–0.2)
BASOPHILS RELATIVE PERCENT: 0.8 % (ref 0–2)
BILIRUB SERPL-MCNC: 0.5 MG/DL (ref 0–1.2)
BUN BLDV-MCNC: 25 MG/DL (ref 8–23)
CALCIUM SERPL-MCNC: 9.9 MG/DL (ref 8.6–10.2)
CHLORIDE BLD-SCNC: 101 MMOL/L (ref 98–107)
CHOLESTEROL, TOTAL: 137 MG/DL (ref 0–199)
CO2: 26 MMOL/L (ref 22–29)
CREAT SERPL-MCNC: 1.3 MG/DL (ref 0.7–1.2)
CREATININE URINE POCT: NORMAL
EOSINOPHILS ABSOLUTE: 0.28 E9/L (ref 0.05–0.5)
EOSINOPHILS RELATIVE PERCENT: 2.7 % (ref 0–6)
GFR AFRICAN AMERICAN: >60
GFR NON-AFRICAN AMERICAN: 54 ML/MIN/1.73
GLUCOSE BLD-MCNC: 100 MG/DL (ref 74–99)
HCT VFR BLD CALC: 44.4 % (ref 37–54)
HDLC SERPL-MCNC: 34 MG/DL
HEMOGLOBIN: 13.9 G/DL (ref 12.5–16.5)
IMMATURE GRANULOCYTES #: 0.03 E9/L
IMMATURE GRANULOCYTES %: 0.3 % (ref 0–5)
LDL CHOLESTEROL CALCULATED: 71 MG/DL (ref 0–99)
LYMPHOCYTES ABSOLUTE: 2.32 E9/L (ref 1.5–4)
LYMPHOCYTES RELATIVE PERCENT: 22.6 % (ref 20–42)
MCH RBC QN AUTO: 30.2 PG (ref 26–35)
MCHC RBC AUTO-ENTMCNC: 31.3 % (ref 32–34.5)
MCV RBC AUTO: 96.3 FL (ref 80–99.9)
MICROALBUMIN/CREAT 24H UR: NORMAL MG/G{CREAT}
MICROALBUMIN/CREAT UR-RTO: NORMAL
MONOCYTES ABSOLUTE: 0.84 E9/L (ref 0.1–0.95)
MONOCYTES RELATIVE PERCENT: 8.2 % (ref 2–12)
NEUTROPHILS ABSOLUTE: 6.7 E9/L (ref 1.8–7.3)
NEUTROPHILS RELATIVE PERCENT: 65.4 % (ref 43–80)
PDW BLD-RTO: 15.9 FL (ref 11.5–15)
PLATELET # BLD: 246 E9/L (ref 130–450)
PMV BLD AUTO: 10.8 FL (ref 7–12)
POTASSIUM SERPL-SCNC: 4.8 MMOL/L (ref 3.5–5)
RBC # BLD: 4.61 E12/L (ref 3.8–5.8)
SODIUM BLD-SCNC: 145 MMOL/L (ref 132–146)
TOTAL PROTEIN: 6.7 G/DL (ref 6.4–8.3)
TRIGL SERPL-MCNC: 158 MG/DL (ref 0–149)
TSH SERPL DL<=0.05 MIU/L-ACNC: 1.92 UIU/ML (ref 0.27–4.2)
VLDLC SERPL CALC-MCNC: 32 MG/DL
WBC # BLD: 10.3 E9/L (ref 4.5–11.5)

## 2020-11-09 PROCEDURE — 82044 UR ALBUMIN SEMIQUANTITATIVE: CPT | Performed by: NURSE PRACTITIONER

## 2020-11-09 PROCEDURE — 36415 COLL VENOUS BLD VENIPUNCTURE: CPT | Performed by: NURSE PRACTITIONER

## 2020-11-10 LAB — HBA1C MFR BLD: 6.6 % (ref 4–5.6)

## 2020-11-11 ENCOUNTER — OFFICE VISIT (OUTPATIENT)
Dept: FAMILY MEDICINE CLINIC | Age: 70
End: 2020-11-11
Payer: MEDICARE

## 2020-11-11 VITALS
HEIGHT: 69 IN | DIASTOLIC BLOOD PRESSURE: 64 MMHG | TEMPERATURE: 97 F | WEIGHT: 277 LBS | HEART RATE: 77 BPM | OXYGEN SATURATION: 98 % | BODY MASS INDEX: 41.03 KG/M2 | SYSTOLIC BLOOD PRESSURE: 136 MMHG

## 2020-11-11 PROCEDURE — 90471 IMMUNIZATION ADMIN: CPT | Performed by: NURSE PRACTITIONER

## 2020-11-11 PROCEDURE — 99214 OFFICE O/P EST MOD 30 MIN: CPT | Performed by: NURSE PRACTITIONER

## 2020-11-11 PROCEDURE — 82044 UR ALBUMIN SEMIQUANTITATIVE: CPT | Performed by: NURSE PRACTITIONER

## 2020-11-11 PROCEDURE — 90714 TD VACC NO PRESV 7 YRS+ IM: CPT | Performed by: NURSE PRACTITIONER

## 2020-11-11 RX ORDER — AMOXICILLIN 500 MG/1
500 CAPSULE ORAL 3 TIMES DAILY
Qty: 30 CAPSULE | Refills: 1 | Status: SHIPPED | OUTPATIENT
Start: 2020-11-11 | End: 2020-11-21

## 2020-11-11 ASSESSMENT — ENCOUNTER SYMPTOMS
CHEST TIGHTNESS: 0
COUGH: 0
BACK PAIN: 0
NAUSEA: 0
VOICE CHANGE: 0
WHEEZING: 0
DIARRHEA: 0
SHORTNESS OF BREATH: 0
CONSTIPATION: 0
BLOOD IN STOOL: 0
TROUBLE SWALLOWING: 0
ABDOMINAL PAIN: 0
VOMITING: 0

## 2020-11-11 NOTE — PROGRESS NOTES
N     Meds Listed in Medical Record: Y     Hyperlipidemia: Mild in intensity but controlled on  atorvastatin (LIPITOR) 80 MG tablet, Take 80 mg by mouth daily, Disp: , Rfl:  without symptoms, no complications with dietary treatment regimen reporting no side effects or intolereances. Compliant with treatment and diet. No muscle aches, new joint pains or abd pain. Diabetes   Pertinent negatives for hypoglycemia include no confusion, dizziness, headaches, nervousness/anxiousness or seizures. Pertinent negatives for diabetes include no chest pain, no fatigue, no polydipsia, no polyphagia, no polyuria and no weakness. Review of Systems   Constitutional: Negative for activity change, chills, diaphoresis, fatigue, fever and unexpected weight change. HENT: Negative for trouble swallowing and voice change. Eyes: Negative for visual disturbance. Respiratory: Negative for cough, chest tightness, shortness of breath and wheezing. Cardiovascular: Negative for chest pain, palpitations and leg swelling. Gastrointestinal: Negative for abdominal pain, blood in stool, constipation, diarrhea, nausea and vomiting. Endocrine: Negative for polydipsia, polyphagia and polyuria. Genitourinary: Negative for dysuria, enuresis, frequency and hematuria. Musculoskeletal: Negative for arthralgias, back pain, gait problem, joint swelling, myalgias and neck stiffness. Skin: Negative for rash. Neurological: Negative for dizziness, seizures, syncope, facial asymmetry, weakness, light-headedness, numbness and headaches. Hematological: Does not bruise/bleed easily. Psychiatric/Behavioral: Negative for behavioral problems, confusion, hallucinations and suicidal ideas. The patient is not nervous/anxious.           Current Outpatient Medications:     amoxicillin (AMOXIL) 500 MG capsule, Take 1 capsule by mouth 3 times daily for 10 days, Disp: 30 capsule, Rfl: 1    clopidogrel (PLAVIX) 75 MG tablet, Take 1 tablet by mouth daily, Disp: , Rfl:     rivaroxaban (XARELTO) 20 MG TABS tablet, Take 20 mg by mouth daily, Disp: , Rfl:     lisinopril (PRINIVIL;ZESTRIL) 20 MG tablet, Take 20 mg by mouth 2 times daily, Disp: , Rfl:     aspirin 81 MG EC tablet, Take 81 mg by mouth daily, Disp: , Rfl:     Multiple Vitamins-Minerals (CENTRUM SILVER ADULT 50+ PO), Take 1 tablet by mouth daily, Disp: , Rfl:     naproxen sodium (ALEVE) 220 MG tablet, Take 440 mg by mouth daily, Disp: , Rfl:     furosemide (LASIX) 80 MG tablet, Take 40 mg by mouth 2 times daily , Disp: , Rfl:     carvedilol (COREG) 25 MG tablet, Take 25 mg by mouth 2 times daily, Disp: , Rfl:     atorvastatin (LIPITOR) 80 MG tablet, Take 80 mg by mouth daily, Disp: , Rfl:     dilTIAZem (CARDIZEM CD) 120 MG extended release capsule, Take 120 mg by mouth daily, Disp: , Rfl:     metFORMIN (GLUCOPHAGE) 500 MG tablet, Take 500 mg by mouth 2 times daily , Disp: , Rfl:   No Known Allergies    Past Medical History:   Diagnosis Date    CAD (coronary artery disease)     Diabetes mellitus ([facility] [address]     Hyperlipidemia     Hypertension     Myocardial infarction [facility])      Past Surgical History:   Procedure Laterality Date    CAROTID STENT PLACEMENT      CATARACT REMOVAL Bilateral     CORONARY ARTERY BYPASS GRAFT  12/01/1997    KNEE ARTHROSCOPY Bilateral 09/02/2016     Family History   Problem Relation Age of Onset    Cancer Mother         STOMACH    Heart Attack Father     Coronary Art Dis Father     High Blood Pressure Father     High Cholesterol Father     Other Father         CHF    Heart Attack Brother     Cancer Brother         LIVER    Coronary Art Dis Brother     High Blood Pressure Brother     Stroke Brother     High Cholesterol Brother     Other Brother         CHF     Social History     Socioeconomic History    Marital status:      Spouse name: Not on file    Number of children: Not on file    Years of education: Not on file   [facility] education level: Not on file   Occupational History    Not on file   Social Needs    Financial resource strain: Not on file    Food insecurity     Worry: Not on file     Inability: Not on file    Transportation needs     Medical: Not on file     Non-medical: Not on file   Tobacco Use    Smoking status: Former Smoker     Packs/day: 2.00     Last attempt to quit: 1995     Years since quittin.0    Smokeless tobacco: Never Used   Substance and Sexual Activity    Alcohol use: Yes     Comment: BEER OCCASIONALLY    Drug use: Never    Sexual activity: Not on file   Lifestyle    Physical activity     Days per week: Not on file     Minutes per session: Not on file    Stress: Not on file   Relationships    Social connections     Talks on phone: Not on file     Gets together: Not on file     Attends Zoroastrianism service: Not on file     Active member of club or organization: Not on file     Attends meetings of clubs or organizations: Not on file     Relationship status: Not on file    Intimate partner violence     Fear of current or ex partner: Not on file     Emotionally abused: Not on file     Physically abused: Not on file     Forced sexual activity: Not on file   Other Topics Concern    Not on file   Social History Narrative    Not on file       Vitals:    20 1313   BP: 136/64   Pulse: 77   Temp: 97 °F (36.1 °C)   SpO2: 98%   Weight: 277 lb (125.6 kg)   Height: 5' 9\" (1.753 m)       Physical Exam  Vitals signs and nursing note reviewed. Constitutional:       Appearance: Normal appearance. HENT:      Head: Normocephalic. Right Ear: Tympanic membrane and ear canal normal. There is no impacted cerumen. Left Ear: Tympanic membrane and ear canal normal. There is no impacted cerumen. Nose: Nose normal.      Mouth/Throat:      Mouth: Mucous membranes are dry. Eyes:      Extraocular Movements: Extraocular movements intact. Pupils: Pupils are equal, round, and reactive to light. Neck:      Musculoskeletal: No neck rigidity or muscular tenderness. Vascular: No carotid bruit. Cardiovascular:      Rate and Rhythm: Normal rate and regular rhythm. Pulses: Normal pulses. Heart sounds: Normal heart sounds. No murmur. No friction rub. No gallop. Pulmonary:      Effort: Pulmonary effort is normal. No respiratory distress. Breath sounds: Normal breath sounds. No stridor. No wheezing, rhonchi or rales. Chest:      Chest wall: No tenderness. Abdominal:      General: Bowel sounds are normal. There is no distension. Palpations: Abdomen is soft. Musculoskeletal:         General: No swelling, tenderness, deformity or signs of injury. Right lower leg: No edema. Left lower leg: No edema. Lymphadenopathy:      Cervical: No cervical adenopathy. Skin:     General: Skin is warm and dry. Capillary Refill: Capillary refill takes 2 to 3 seconds. Findings: No bruising, lesion or rash. Neurological:      General: No focal deficit present. Mental Status: He is alert and oriented to person, place, and time. Motor: No weakness. Gait: Gait normal.   Psychiatric:         Attention and Perception: Attention normal.         Mood and Affect: Mood normal.         Behavior: Behavior normal.         Thought Content: Thought content does not include homicidal or suicidal ideation. Thought content does not include homicidal or suicidal plan. Assessment and Plan:  There are no diagnoses linked to this encounter. No follow-ups on file. USPTF:    (2015 Colorectal Cancer: Screening --Adults aged 48 to 76 years  Grade: A (Recommended) recommends screening for colorectal cancer starting at age 48 years and continuing until age 76 years.     (136/64 B/P) High Blood Pressure: Screening and Home Monitoring -- Adults  Grade: A (Recommended) recommends screening for high blood pressure in ages 25 years or older.    obtain measurements outside of the clinical setting for diagnostic confirmation before starting treatment. Annual screening for adults aged 36 years or older or those who are at increased risk for blood pressure    (Trig up but doing good stable)  Lipid Disorders in Adults: Screening -- Men 28 and Older  Grade: A (Recommended) recommends screening men aged 28 and older for lipid disorders. (Social) Alcohol Misuse: Screening and Behavioral Counseling Interventions in Primary Care -- Adults  Grade: B (Recommended) recommends that clinicians screen adults aged 25 years or older for alcohol misuse and provide persons engaged in risky or hazardous drinking with brief behavioral counseling interventions to reduce alcohol misuse. (AIC 6.6) Abnormal Blood Glucose and Type 2 Diabetes Mellitus: Screening -- Adults aged 36 to 79 years who are overweight or obese Grade: B (Recommended)    (BMI 40.91)  Obesity: Screening for and Management of-- All Adults  Grade: B(Recommended) recommends screening all adults for obesity. Clinicians should offer or refer patients with a body mass index (BMI) of 30 kg/m2 or higher to intensive, multicomponent behavioral interventions. (Steady Gait)  Fall Prevention -- Exercise/Physical Therapy: Community-dwelling Adults 72 Years or Older, Increased Risk for Falls   Grade: B (Recommended) recommends exercise or physical therapy to prevent falls in community-dwelling adults aged 72 years or older who are at increased risk for falls. (Non reported or noted today)  Depression: Screening -- General adult population, including pregnant and postpartum women  Grade: B(Recommended) recommends screening for depression in the general adult population,  Screening should be implemented with adequate systems in place to ensure accurate diagnosis, effective treatment, and appropriate follow-up.     (2019) Glaucoma: Screening - Adults and Diabetic Eye Exam     (2020) Coronary Heart Disease: Screening with Electrocardiography--Adults at Low Risk  Grade: D (Not Recommended)     (  ) Prostate Cancer: Prostate-Specific Antigen (PSA)-Based Screening -- All Men   PSA  2.0  May 2020    Cat scan on Nov 5 2020   Sleep study in October mod to severe and they wanted to do a hospital one and he refused  Sees Cardiology soon is doing much better          Seen By:  HENRRY Bernabe - CNP

## 2020-11-11 NOTE — PATIENT INSTRUCTIONS
take medicines. · Your doctor may suggest a procedure to open narrowed or blocked arteries. This is called angioplasty. Or your doctor may suggest using healthy blood vessels to create detours around narrowed or blocked arteries. This is called bypass surgery. Follow-up care is a key part of your treatment and safety. Be sure to make and go to all appointments, and call your doctor if you are having problems. It's also a good idea to know your test results and keep a list of the medicines you take. Where can you learn more? Go to [email or web address] org and sign in to your Kran[employer] account. Enter [phone] in the PubGame box to learn more about \"Learning About Coronary Artery Disease (CAD). \"     If you do not have an account, please click on the \"Sign Up Now\" link. Current as of: December 16, 2019               Content Version: 12.6  © 0452-7154 [employer]. Care instructions adapted under license by [facility] ([facility]). If you have questions about a medical condition or this instruction, always ask your healthcare professional. [place] [address] any warranty or liability for your use of this information.

## 2021-01-01 ENCOUNTER — TELEPHONE (OUTPATIENT)
Dept: FAMILY MEDICINE CLINIC | Age: 71
End: 2021-01-01

## 2021-01-01 ENCOUNTER — OFFICE VISIT (OUTPATIENT)
Dept: FAMILY MEDICINE CLINIC | Age: 71
End: 2021-01-01
Payer: COMMERCIAL

## 2021-01-01 VITALS
SYSTOLIC BLOOD PRESSURE: 138 MMHG | WEIGHT: 260.5 LBS | OXYGEN SATURATION: 96 % | HEIGHT: 69 IN | TEMPERATURE: 97.3 F | DIASTOLIC BLOOD PRESSURE: 64 MMHG | BODY MASS INDEX: 38.58 KG/M2 | HEART RATE: 63 BPM

## 2021-01-01 VITALS
HEIGHT: 69 IN | DIASTOLIC BLOOD PRESSURE: 64 MMHG | SYSTOLIC BLOOD PRESSURE: 138 MMHG | WEIGHT: 260.5 LBS | OXYGEN SATURATION: 96 % | BODY MASS INDEX: 38.58 KG/M2 | TEMPERATURE: 97.3 F | HEART RATE: 63 BPM

## 2021-01-01 DIAGNOSIS — I10 ESSENTIAL HYPERTENSION: ICD-10-CM

## 2021-01-01 DIAGNOSIS — I50.811 ACUTE RIGHT-SIDED CONGESTIVE HEART FAILURE (HCC): ICD-10-CM

## 2021-01-01 DIAGNOSIS — I25.2 PERSONAL HISTORY OF MI (MYOCARDIAL INFARCTION): ICD-10-CM

## 2021-01-01 DIAGNOSIS — E11.9 TYPE 2 DIABETES MELLITUS WITHOUT COMPLICATION, WITHOUT LONG-TERM CURRENT USE OF INSULIN (HCC): Primary | ICD-10-CM

## 2021-01-01 DIAGNOSIS — Z12.12 SCREENING FOR RECTAL CANCER: Primary | ICD-10-CM

## 2021-01-01 DIAGNOSIS — Z00.00 ROUTINE GENERAL MEDICAL EXAMINATION AT A HEALTH CARE FACILITY: ICD-10-CM

## 2021-01-01 DIAGNOSIS — Z12.12 SCREENING FOR RECTAL CANCER: ICD-10-CM

## 2021-01-01 DIAGNOSIS — Z13.6 SCREENING FOR AAA (ABDOMINAL AORTIC ANEURYSM): ICD-10-CM

## 2021-01-01 DIAGNOSIS — Z87.891 HISTORY OF NICOTINE USE: ICD-10-CM

## 2021-01-01 DIAGNOSIS — R19.5 POSITIVE COLORECTAL CANCER SCREENING USING COLOGUARD TEST: Primary | ICD-10-CM

## 2021-01-01 DIAGNOSIS — I21.02 ST ELEVATION MYOCARDIAL INFARCTION INVOLVING LEFT ANTERIOR DESCENDING (LAD) CORONARY ARTERY (HCC): ICD-10-CM

## 2021-01-01 DIAGNOSIS — I25.119 CORONARY ARTERY DISEASE WITH ANGINA PECTORIS, UNSPECIFIED VESSEL OR LESION TYPE, UNSPECIFIED WHETHER NATIVE OR TRANSPLANTED HEART (HCC): ICD-10-CM

## 2021-01-01 DIAGNOSIS — E78.01 FAMILIAL HYPERCHOLESTEROLEMIA: ICD-10-CM

## 2021-01-01 LAB — HBA1C MFR BLD: 6.4 %

## 2021-01-01 PROCEDURE — G0439 PPPS, SUBSEQ VISIT: HCPCS | Performed by: NURSE PRACTITIONER

## 2021-01-01 PROCEDURE — 2022F DILAT RTA XM EVC RTNOPTHY: CPT | Performed by: NURSE PRACTITIONER

## 2021-01-01 PROCEDURE — G8417 CALC BMI ABV UP PARAM F/U: HCPCS | Performed by: NURSE PRACTITIONER

## 2021-01-01 PROCEDURE — 1036F TOBACCO NON-USER: CPT | Performed by: NURSE PRACTITIONER

## 2021-01-01 PROCEDURE — 3017F COLORECTAL CA SCREEN DOC REV: CPT | Performed by: NURSE PRACTITIONER

## 2021-01-01 PROCEDURE — G8427 DOCREV CUR MEDS BY ELIG CLIN: HCPCS | Performed by: NURSE PRACTITIONER

## 2021-01-01 PROCEDURE — 4040F PNEUMOC VAC/ADMIN/RCVD: CPT | Performed by: NURSE PRACTITIONER

## 2021-01-01 PROCEDURE — 83036 HEMOGLOBIN GLYCOSYLATED A1C: CPT | Performed by: NURSE PRACTITIONER

## 2021-01-01 PROCEDURE — 3044F HG A1C LEVEL LT 7.0%: CPT | Performed by: NURSE PRACTITIONER

## 2021-01-01 PROCEDURE — 1123F ACP DISCUSS/DSCN MKR DOCD: CPT | Performed by: NURSE PRACTITIONER

## 2021-01-01 PROCEDURE — 99214 OFFICE O/P EST MOD 30 MIN: CPT | Performed by: NURSE PRACTITIONER

## 2021-01-01 ASSESSMENT — LIFESTYLE VARIABLES
HOW OFTEN DURING THE LAST YEAR HAVE YOU FAILED TO DO WHAT WAS NORMALLY EXPECTED FROM YOU BECAUSE OF DRINKING: NEVER
HOW OFTEN DURING THE LAST YEAR HAVE YOU FOUND THAT YOU WERE NOT ABLE TO STOP DRINKING ONCE YOU HAD STARTED: NEVER
HOW OFTEN DO YOU HAVE A DRINK CONTAINING ALCOHOL: MONTHLY OR LESS
HOW OFTEN DO YOU HAVE SIX OR MORE DRINKS ON ONE OCCASION: NEVER
HAVE YOU OR SOMEONE ELSE BEEN INJURED AS A RESULT OF YOUR DRINKING: 0
HOW OFTEN DO YOU HAVE SIX OR MORE DRINKS ON ONE OCCASION: 0
HOW OFTEN DURING THE LAST YEAR HAVE YOU HAD A FEELING OF GUILT OR REMORSE AFTER DRINKING: NEVER
HAS A RELATIVE, FRIEND, DOCTOR, OR ANOTHER HEALTH PROFESSIONAL EXPRESSED CONCERN ABOUT YOUR DRINKING OR SUGGESTED YOU CUT DOWN: NO
HOW OFTEN DURING THE LAST YEAR HAVE YOU FAILED TO DO WHAT WAS NORMALLY EXPECTED FROM YOU BECAUSE OF DRINKING: 0
HOW OFTEN DURING THE LAST YEAR HAVE YOU HAD A FEELING OF GUILT OR REMORSE AFTER DRINKING: 0
HOW OFTEN DURING THE LAST YEAR HAVE YOU NEEDED AN ALCOHOLIC DRINK FIRST THING IN THE MORNING TO GET YOURSELF GOING AFTER A NIGHT OF HEAVY DRINKING: 0
HOW OFTEN DO YOU HAVE A DRINK CONTAINING ALCOHOL: 1
HOW MANY STANDARD DRINKS CONTAINING ALCOHOL DO YOU HAVE ON A TYPICAL DAY: ONE OR TWO
AUDIT TOTAL SCORE: 0
HOW OFTEN DURING THE LAST YEAR HAVE YOU BEEN UNABLE TO REMEMBER WHAT HAPPENED THE NIGHT BEFORE BECAUSE YOU HAD BEEN DRINKING: 0
AUDIT TOTAL SCORE: 1
HOW MANY STANDARD DRINKS CONTAINING ALCOHOL DO YOU HAVE ON A TYPICAL DAY: 0
AUDIT-C TOTAL SCORE: 1
AUDIT-C TOTAL SCORE: 0
HOW OFTEN DURING THE LAST YEAR HAVE YOU NEEDED AN ALCOHOLIC DRINK FIRST THING IN THE MORNING TO GET YOURSELF GOING AFTER A NIGHT OF HEAVY DRINKING: NEVER
HAS A RELATIVE, FRIEND, DOCTOR, OR ANOTHER HEALTH PROFESSIONAL EXPRESSED CONCERN ABOUT YOUR DRINKING OR SUGGESTED YOU CUT DOWN: 0
HOW OFTEN DURING THE LAST YEAR HAVE YOU FOUND THAT YOU WERE NOT ABLE TO STOP DRINKING ONCE YOU HAD STARTED: 0
HAVE YOU OR SOMEONE ELSE BEEN INJURED AS A RESULT OF YOUR DRINKING: NO
HOW OFTEN DURING THE LAST YEAR HAVE YOU BEEN UNABLE TO REMEMBER WHAT HAPPENED THE NIGHT BEFORE BECAUSE YOU HAD BEEN DRINKING: NEVER

## 2021-01-01 ASSESSMENT — PATIENT HEALTH QUESTIONNAIRE - PHQ9
SUM OF ALL RESPONSES TO PHQ QUESTIONS 1-9: 0
SUM OF ALL RESPONSES TO PHQ QUESTIONS 1-9: 0
1. LITTLE INTEREST OR PLEASURE IN DOING THINGS: 0
SUM OF ALL RESPONSES TO PHQ QUESTIONS 1-9: 0
2. FEELING DOWN, DEPRESSED OR HOPELESS: 0
SUM OF ALL RESPONSES TO PHQ9 QUESTIONS 1 & 2: 0

## 2021-01-01 ASSESSMENT — ENCOUNTER SYMPTOMS
BLOOD IN STOOL: 0
WHEEZING: 0
CHEST TIGHTNESS: 0
ABDOMINAL PAIN: 0
CONSTIPATION: 0
DIARRHEA: 0
NAUSEA: 0
VOICE CHANGE: 0
BACK PAIN: 0
VOMITING: 0
COUGH: 0
SHORTNESS OF BREATH: 0
TROUBLE SWALLOWING: 0

## 2021-04-07 LAB
ALBUMIN SERPL-MCNC: NORMAL G/DL
ALP BLD-CCNC: NORMAL U/L
ALT SERPL-CCNC: NORMAL U/L
ANION GAP SERPL CALCULATED.3IONS-SCNC: NORMAL MMOL/L
AST SERPL-CCNC: NORMAL U/L
AVERAGE GLUCOSE: NORMAL
BASOPHILS ABSOLUTE: NORMAL
BASOPHILS RELATIVE PERCENT: NORMAL
BILIRUB SERPL-MCNC: NORMAL MG/DL
BUN BLDV-MCNC: NORMAL MG/DL
CALCIUM SERPL-MCNC: NORMAL MG/DL
CHLORIDE BLD-SCNC: NORMAL MMOL/L
CO2: NORMAL
CREAT SERPL-MCNC: NORMAL MG/DL
EOSINOPHILS ABSOLUTE: NORMAL
EOSINOPHILS RELATIVE PERCENT: NORMAL
GFR CALCULATED: NORMAL
GLUCOSE BLD-MCNC: NORMAL MG/DL
HBA1C MFR BLD: 6.3 %
HCT VFR BLD CALC: NORMAL %
HEMOGLOBIN: NORMAL
LYMPHOCYTES ABSOLUTE: NORMAL
LYMPHOCYTES RELATIVE PERCENT: NORMAL
MCH RBC QN AUTO: NORMAL PG
MCHC RBC AUTO-ENTMCNC: NORMAL G/DL
MCV RBC AUTO: NORMAL FL
MONOCYTES ABSOLUTE: NORMAL
MONOCYTES RELATIVE PERCENT: NORMAL
NEUTROPHILS ABSOLUTE: NORMAL
NEUTROPHILS RELATIVE PERCENT: NORMAL
PDW BLD-RTO: NORMAL %
PLATELET # BLD: NORMAL 10*3/UL
PMV BLD AUTO: NORMAL FL
POTASSIUM SERPL-SCNC: NORMAL MMOL/L
RBC # BLD: NORMAL 10*6/UL
SODIUM BLD-SCNC: NORMAL MMOL/L
TOTAL PROTEIN: NORMAL
WBC # BLD: NORMAL 10*3/UL

## 2021-04-14 ENCOUNTER — OFFICE VISIT (OUTPATIENT)
Dept: FAMILY MEDICINE CLINIC | Age: 71
End: 2021-04-14
Payer: COMMERCIAL

## 2021-04-14 VITALS
WEIGHT: 270.9 LBS | HEART RATE: 74 BPM | OXYGEN SATURATION: 99 % | SYSTOLIC BLOOD PRESSURE: 134 MMHG | DIASTOLIC BLOOD PRESSURE: 80 MMHG | HEIGHT: 69 IN | BODY MASS INDEX: 40.12 KG/M2 | RESPIRATION RATE: 20 BRPM | TEMPERATURE: 96.8 F

## 2021-04-14 DIAGNOSIS — E11.9 TYPE 2 DIABETES MELLITUS WITHOUT COMPLICATION, WITHOUT LONG-TERM CURRENT USE OF INSULIN (HCC): ICD-10-CM

## 2021-04-14 DIAGNOSIS — I25.119 CORONARY ARTERY DISEASE WITH ANGINA PECTORIS, UNSPECIFIED VESSEL OR LESION TYPE, UNSPECIFIED WHETHER NATIVE OR TRANSPLANTED HEART (HCC): ICD-10-CM

## 2021-04-14 DIAGNOSIS — I10 ESSENTIAL HYPERTENSION: ICD-10-CM

## 2021-04-14 DIAGNOSIS — I25.2 PERSONAL HISTORY OF MI (MYOCARDIAL INFARCTION): ICD-10-CM

## 2021-04-14 DIAGNOSIS — I10 ESSENTIAL HYPERTENSION: Primary | ICD-10-CM

## 2021-04-14 DIAGNOSIS — E78.01 FAMILIAL HYPERCHOLESTEROLEMIA: ICD-10-CM

## 2021-04-14 DIAGNOSIS — I50.812 CHRONIC RIGHT-SIDED CONGESTIVE HEART FAILURE (HCC): ICD-10-CM

## 2021-04-14 DIAGNOSIS — Z12.5 SCREENING FOR PROSTATE CANCER: ICD-10-CM

## 2021-04-14 PROCEDURE — 1036F TOBACCO NON-USER: CPT | Performed by: NURSE PRACTITIONER

## 2021-04-14 PROCEDURE — 1123F ACP DISCUSS/DSCN MKR DOCD: CPT | Performed by: NURSE PRACTITIONER

## 2021-04-14 PROCEDURE — 3044F HG A1C LEVEL LT 7.0%: CPT | Performed by: NURSE PRACTITIONER

## 2021-04-14 PROCEDURE — 4040F PNEUMOC VAC/ADMIN/RCVD: CPT | Performed by: NURSE PRACTITIONER

## 2021-04-14 PROCEDURE — G8427 DOCREV CUR MEDS BY ELIG CLIN: HCPCS | Performed by: NURSE PRACTITIONER

## 2021-04-14 PROCEDURE — 3017F COLORECTAL CA SCREEN DOC REV: CPT | Performed by: NURSE PRACTITIONER

## 2021-04-14 PROCEDURE — 2022F DILAT RTA XM EVC RTNOPTHY: CPT | Performed by: NURSE PRACTITIONER

## 2021-04-14 PROCEDURE — G8417 CALC BMI ABV UP PARAM F/U: HCPCS | Performed by: NURSE PRACTITIONER

## 2021-04-14 PROCEDURE — 99214 OFFICE O/P EST MOD 30 MIN: CPT | Performed by: NURSE PRACTITIONER

## 2021-04-14 RX ORDER — FUROSEMIDE 40 MG/1
40 TABLET ORAL 2 TIMES DAILY
COMMUNITY

## 2021-04-14 ASSESSMENT — ENCOUNTER SYMPTOMS
DIARRHEA: 0
CONSTIPATION: 0
COUGH: 0
VOICE CHANGE: 0
ABDOMINAL PAIN: 0
CHEST TIGHTNESS: 0
WHEEZING: 0
SHORTNESS OF BREATH: 0
TROUBLE SWALLOWING: 0
BLOOD IN STOOL: 0
BACK PAIN: 0
NAUSEA: 0
VOMITING: 0

## 2021-04-14 ASSESSMENT — PATIENT HEALTH QUESTIONNAIRE - PHQ9
SUM OF ALL RESPONSES TO PHQ9 QUESTIONS 1 & 2: 0
SUM OF ALL RESPONSES TO PHQ QUESTIONS 1-9: 0

## 2021-04-14 NOTE — PROGRESS NOTES
20  Shan Gray : 1950 Sex: male  Age: 69 y.o. Chief Complaint   Patient presents with    Procedure     Right Knee Replacement on 2021 at [facility]. He had his blood work and EKG performed at [facility]       Patient is here for a primary care surgical clerence for his right Total Knee  No issues reported today    CHRONIC CONDITION:    DM: Mild intensity but controlled on  metFORMIN (GLUCOPHAGE) 500 MG tablet, Take 500 mg by mouth 2 times daily, Disp: , Rfl: , remains without symptoms, no weight loss, no increase in thirst, or urination and no low blood sugar incidents. No reports of poor circulation or issues with feet ulceration or injury. Is better when compliant with diet and medications. Vision Exam Up to Date:                               Y      Currently on an ACE:                                    Y  Most Recent Hgb A1c <7:                             Y    6.3  AIC    Microalbumin Test Reviewed:                       Y   Foot Exam Up To Date:                                 2020    Flu Shot Up To Date:                                     2020  Has patient been seen by Neurology            N   Currently on a Statin                                     Y       HTN: Stable hypertension, Hx of CABG controlled on   furosemide (LASIX) 40 MG tablet, Take 40 mg by mouth 2 times daily, Disp: , Rfl:   clopidogrel (PLAVIX) 75 MG tablet, Take 1 tablet by mouth daily, Disp: , Rfl:   rivaroxaban (XARELTO) 20 MG TABS tablet, Take 20 mg by mouth daily, Disp: , Rfl:   lisinopril (PRINIVIL;ZESTRIL) 20 MG tablet, Take 20 mg by mouth 2 times daily, Disp: , Rfl:   carvedilol (COREG) 25 MG tablet, Take 25 mg by mouth 2 times daily, Disp: , Rfl:   dilTIAZem (CARDIZEM CD) 120 MG extended release capsule, Take 120 mg by mouth daily, Disp: , Rfl: remains at a mild intensity but overall good control, without symptoms, no ringing in the ears, no headaches (LASIX) 40 MG tablet, Take 40 mg by mouth 2 times daily, Disp: , Rfl:     metFORMIN (GLUCOPHAGE) 500 MG tablet, Take 1 tablet by mouth 2 times daily, Disp: 180 tablet, Rfl: 1    clopidogrel (PLAVIX) 75 MG tablet, Take 1 tablet by mouth daily, Disp: , Rfl:     rivaroxaban (XARELTO) 20 MG TABS tablet, Take 20 mg by mouth daily, Disp: , Rfl:     lisinopril (PRINIVIL;ZESTRIL) 20 MG tablet, Take 20 mg by mouth 2 times daily, Disp: , Rfl:     Multiple Vitamins-Minerals (CENTRUM SILVER ADULT 50+ PO), Take 1 tablet by mouth daily, Disp: , Rfl:     carvedilol (COREG) 25 MG tablet, Take 25 mg by mouth 2 times daily, Disp: , Rfl:     atorvastatin (LIPITOR) 80 MG tablet, Take 80 mg by mouth daily, Disp: , Rfl:     dilTIAZem (CARDIZEM CD) 120 MG extended release capsule, Take 120 mg by mouth daily, Disp: , Rfl:   No Known Allergies    Past Medical History:   Diagnosis Date    CAD (coronary artery disease)     Diabetes mellitus ([facility] [address]     Hyperlipidemia     Hypertension     Myocardial infarction [facility])      Past Surgical History:   Procedure Laterality Date    CAROTID STENT PLACEMENT      CATARACT REMOVAL Bilateral     CORONARY ARTERY BYPASS GRAFT  12/01/1997    KNEE ARTHROSCOPY Bilateral 09/02/2016     Family History   Problem Relation Age of Onset    Cancer Mother         STOMACH    Heart Attack Father     Coronary Art Dis Father     High Blood Pressure Father     High Cholesterol Father     Other Father         CHF    Heart Attack Brother     Cancer Brother         LIVER    Coronary Art Dis Brother     High Blood Pressure Brother     Stroke Brother     High Cholesterol Brother     Other Brother         CHF     Social History     Socioeconomic History    Marital status:      Spouse name: Not on file    Number of children: Not on file    Years of education: Not on file    Highest education level: Not on file   Occupational History    Not on file   Social Needs    Financial resource strain: Not on file    Food insecurity     Worry: Not on file     Inability: Not on file    Transportation needs     Medical: Not on file     Non-medical: Not on file   Tobacco Use    Smoking status: Former Smoker     Packs/day: 2.00     Years: 35.00     Pack years: 70.00     Types: Cigarettes     Start date: 1960     Quit date: 1995     Years since quittin.4    Smokeless tobacco: Never Used   Substance and Sexual Activity    Alcohol use: Yes     Comment: BEER OCCASIONALLY    Drug use: Never    Sexual activity: Not on file   Lifestyle    Physical activity     Days per week: Not on file     Minutes per session: Not on file    Stress: Not on file   Relationships    Social connections     Talks on phone: Not on file     Gets together: Not on file     Attends Pentecostal service: Not on file     Active member of club or organization: Not on file     Attends meetings of clubs or organizations: Not on file     Relationship status: Not on file    Intimate partner violence     Fear of current or ex partner: Not on file     Emotionally abused: Not on file     Physically abused: Not on file     Forced sexual activity: Not on file   Other Topics Concern    Not on file   Social History Narrative    Not on file       Vitals:    21 1419   BP: 134/80   Cuff Size: Large Adult   Pulse: 74   Resp: 20   Temp: 96.8 °F (36 °C)   TempSrc: Temporal   SpO2: 99%   Weight: 270 lb 14.4 oz (122.9 kg)   Height: 5' 9\" (1.753 m)       Physical Exam  Vitals signs and nursing note reviewed. Constitutional:       Appearance: Normal appearance. HENT:      Head: Normocephalic. Right Ear: Tympanic membrane and ear canal normal. There is no impacted cerumen. Left Ear: Tympanic membrane and ear canal normal. There is no impacted cerumen. Nose: Nose normal.      Mouth/Throat:      Mouth: Mucous membranes are dry. Eyes:      Extraocular Movements: Extraocular movements intact.       Pupils: Pupils are equal, round, and reactive to light. Neck:      Musculoskeletal: No neck rigidity or muscular tenderness. Vascular: No carotid bruit. Cardiovascular:      Rate and Rhythm: Normal rate and regular rhythm. Pulses: Normal pulses. Heart sounds: Normal heart sounds. No murmur. No friction rub. No gallop. Pulmonary:      Effort: Pulmonary effort is normal. No respiratory distress. Breath sounds: Normal breath sounds. No stridor. No wheezing, rhonchi or rales. Chest:      Chest wall: No tenderness. Abdominal:      General: Bowel sounds are normal. There is no distension. Palpations: Abdomen is soft. Musculoskeletal:         General: No swelling, tenderness, deformity or signs of injury. Right lower leg: No edema. Left lower leg: No edema. Lymphadenopathy:      Cervical: No cervical adenopathy. Skin:     General: Skin is warm and dry. Capillary Refill: Capillary refill takes 2 to 3 seconds. Findings: No bruising, lesion or rash. Neurological:      General: No focal deficit present. Mental Status: He is alert and oriented to person, place, and time. Motor: No weakness. Gait: Gait normal.   Psychiatric:         Attention and Perception: Attention normal.         Mood and Affect: Mood normal.         Behavior: Behavior normal.         Thought Content: Thought content does not include homicidal or suicidal ideation. Thought content does not include homicidal or suicidal plan. Assessment and Plan:  Kyrie Acharya was seen today for procedure.     Diagnoses and all orders for this visit:    Essential hypertension    Coronary artery disease with angina pectoris, unspecified vessel or lesion type, unspecified whether native or transplanted heart ([address]    Type 2 diabetes mellitus without complication, without long-term current use of insulin ([address]    Familial hypercholesterolemia    Chronic right-sided congestive heart failure ([address]    Personal history of community-dwelling adults aged 72 years or older who are at increased risk for falls. (Non reported or noted today)  Depression: Screening -- General adult population, including pregnant and postpartum women  Grade: B(Recommended) recommends screening for depression in the general adult population,  Screening should be implemented with adequate systems in place to ensure accurate diagnosis, effective treatment, and appropriate follow-up. (2019) Glaucoma: Screening - Adults and Diabetic Eye Exam     (2020) Coronary Heart Disease: Screening with Electrocardiography--Adults at Low Risk  Grade: D (Not Recommended)     (  ) Prostate Cancer: Prostate-Specific Antigen (PSA)-Based Screening -- All Men   PSA  2.0  May 2020    Sleep study in October mod to severe and they wanted to do a hospital one and he refused    Sees Cardiology No med changes and his next appt is slated for June 1 2021   We need documentation from   Dr. Rekha Smith.  [facility] DO  [address] [facility] [address]  [phone]         Seen By:  HENRRY Helm - CNP

## 2021-04-14 NOTE — PATIENT INSTRUCTIONS
Patient Education        Learning About Total Knee Replacement Surgery  What is a total knee replacement? A total knee replacement replaces the worn ends of the thighbone (femur) and the lower leg bone (tibia) where they meet at the knee. Sometimes the surface of the patella (kneecap) is replaced too. You may want this surgery if you have knee pain, stiffness, swelling, or problems moving your knee that you cannot treat in other ways. For most people, these problems are caused by arthritis. They can also be caused by a knee injury. If you need to have both knees replaced, you may have both surgeries at the same time. Or your doctor may recommend doing one knee at a time. Your doctor would replace the second knee after you recover from the first knee surgery. Recovery after a double knee replacement takes longer than after a single replacement. How is a total knee replacement done? Before surgery, you will get medicine to make you sleep or feel relaxed. If you will be awake during surgery, you will also get a shot of medicine into your spine to make your legs numb. There are two types of replacement joints. They are:  · Cemented joints. The cement acts as glue, attaching the new joint to the bone. · Uncemented joints. These have a metal coating with many small openings. Over time, new bone grows and fills up the openings. This new bone attaches the joint to the bone. Your doctor may also use a combination of cemented and uncemented parts. Your doctor makes a 4- to 10-inch cut, called an incision, on the front of your knee. Your doctor then:  · Replaces the damaged part of your femur with a metal piece. · Replaces the damaged part of your tibia with a metal piece and plastic surface. · May replace part of your kneecap with plastic. The doctor finishes the surgery by closing your incision with stitches, staples, skin glue, or tape strips.   What can you expect as you recover from total knee replacement surgery? Your knee will be swollen and will hurt when you move it. You'll need to take pain medicine for a time after surgery. Most people will start to walk with a walker or crutches the day of surgery. You'll start rehabilitation (rehab) before you leave the hospital. Rehab will help you improve strength and movement in your knee. You may need some extra support or help at home for the first few weeks. After you recover, you should be able to do activities such as go for walks, dance, and ride a bike on flat ground. Talk to your doctor about whether you can do more strenuous activities. Follow-up care is a key part of your treatment and safety. Be sure to make and go to all appointments, and call your doctor if you are having problems. It's also a good idea to know your test results and keep a list of the medicines you take. Where can you learn more? Go to [email or web address].[employer]. org and sign in to your Savvy Services account. Enter C392 in the gantto box to learn more about \"Learning About Total Knee Replacement Surgery. \"     If you do not have an account, please click on the \"Sign Up Now\" link. Current as of: November 16, 2020               Content Version: 12.8  © 2006-2021 [employer], Incorporated. Care instructions adapted under license by [facility] ([facility]). If you have questions about a medical condition or this instruction, always ask your healthcare professional. [place] [address] any warranty or liability for your use of this information.

## 2021-08-12 ENCOUNTER — NURSE ONLY (OUTPATIENT)
Dept: FAMILY MEDICINE CLINIC | Age: 71
End: 2021-08-12
Payer: COMMERCIAL

## 2021-08-12 DIAGNOSIS — Z12.5 PROSTATE CANCER SCREENING: ICD-10-CM

## 2021-08-12 DIAGNOSIS — E78.01 FAMILIAL HYPERCHOLESTEROLEMIA: ICD-10-CM

## 2021-08-12 DIAGNOSIS — I10 ESSENTIAL HYPERTENSION: ICD-10-CM

## 2021-08-12 PROCEDURE — 36415 COLL VENOUS BLD VENIPUNCTURE: CPT | Performed by: NURSE PRACTITIONER

## 2021-08-18 NOTE — PROGRESS NOTES
DO  [address] Rogelio Atkinson [facility] [place][note 2]  [phone]      Educational materials printed for patient's review and were included in patient instructions on his After Visit Summary and given to patient at the end of visit. Counseled regarding above diagnosis, including possible risks and complications,  especially if left uncontrolled. Counseled regarding the possible side effects, risks, benefits and alternatives to treatment; patient and/or guardian verbalizes understanding, agrees, feels comfortable with and wishes to proceed with above treatment plan. Advised patient to call with any new medication issues, and read all Rx info from pharmacy to assure aware of all possible risks and side effects of medication before taking. Reviewed age and gender appropriate health screening exams and vaccinations. Advised patient regarding importance of keeping up with recommended health maintenance and to schedule as soon as possible if overdue, as this is important in assessing for undiagnosed pathology, especially cancer, as well as protecting against potentially harmful/life threatening disease. Patient verbalizes understanding and agrees with above counseling, assessment and plan. All questions answered. On 08/18/21 I have spent 30 reviewing previous notes, test results and face to face with the patient discussing the diagnosis and importance of compliance with the treatment plan as well as documenting on the day of the visit. Educational materials exercises printed for patient's review and were included in patient instructions on their After Visit Summary and given to patient at the end of visit.      Return in about 6 months (around 2/18/2022) for Routine Visit with Labs. CHRONIC CONDITION:    DM:  Mild intensity but controlled on  metFORMIN (GLUCOPHAGE) 500 MG tablet, Take 500 mg by mouth 2 times daily, Disp: , Rfl: , remains without symptoms, no weight loss, no increase in thirst, or urination and no low blood sugar incidents. No reports of poor circulation or issues with feet ulceration or injury. Is better when compliant with diet and medications. Vision Exam Up to Date:                               Y    2019  Currently on an ACE:                                    Y  Most Recent Hgb A1c <7:                             Y    6.4  AIC    Microalbumin Test Reviewed: Y   Foot Exam Up To Date:                                 Y    2021    Flu Shot Up To Date:                                     Y    2021  Has patient been seen by Neurology            N   Currently on a Statin                                     Y       HTN: Stable hypertension, Hx of CABG controlled on   furosemide (LASIX) 40 MG tablet, Take 40 mg by mouth 2 times daily, Disp: , Rfl:   clopidogrel (PLAVIX) 75 MG tablet, Take 1 tablet by mouth daily, Disp: , Rfl:   rivaroxaban (XARELTO) 20 MG TABS tablet, Take 20 mg by mouth daily, Disp: , Rfl:   lisinopril (PRINIVIL;ZESTRIL) 20 MG tablet, Take 20 mg by mouth 2 times daily, Disp: , Rfl:   carvedilol (COREG) 25 MG tablet, Take 25 mg by mouth 2 times daily, Disp: , Rfl:   dilTIAZem (CARDIZEM CD) 120 MG extended release capsule, Take 120 mg by mouth daily, Disp: , Rfl: remains at a mild intensity but overall good control, without symptoms, no ringing in the ears, no headaches and no nose bleeds. Better on medications. CAD and CHF: Patient currently on a Betablocker and an ACE for LVSD. Tobacco Use: N History of     Flu Shot Up To Date: N     Meds Listed in Medical Record: Y     Hyperlipidemia: Mild in intensity but controlled on  atorvastatin (LIPITOR) 80 MG tablet, Take 80 mg by mouth daily, Disp: , Rfl:  without symptoms, no complications with dietary treatment regimen reporting no side effects or intolereances. Compliant with treatment and diet. No muscle aches, new joint pains or abd pain.         Review of Systems   Constitutional: Allergies    Past Medical History:   Diagnosis Date    CAD (coronary artery disease)     Diabetes mellitus ([address]     Hyperlipidemia     Hypertension     Myocardial infarction [facility])      Past Surgical History:   Procedure Laterality Date    CAROTID STENT PLACEMENT      CATARACT REMOVAL Bilateral     CORONARY ARTERY BYPASS GRAFT  1997    KNEE ARTHROSCOPY Bilateral 2016     Family History   Problem Relation Age of Onset    Cancer Mother         STOMACH    Heart Attack Father     Coronary Art Dis Father     High Blood Pressure Father     High Cholesterol Father     Other Father         CHF    Heart Attack Brother     Cancer Brother         LIVER    Coronary Art Dis Brother     High Blood Pressure Brother     Stroke Brother     High Cholesterol Brother     Other Brother         CHF     Social History     Socioeconomic History    Marital status:      Spouse name: Not on file    Number of children: Not on file    Years of education: Not on file    Highest education level: Not on file   Occupational History    Not on file   Tobacco Use    Smoking status: Former Smoker     Packs/day: 2.00     Years: 35.00     Pack years: 70.00     Types: Cigarettes     Start date: 1960     Quit date: 1995     Years since quittin.7    Smokeless tobacco: Never Used   Vaping Use    Vaping Use: Never used   Substance and Sexual Activity    Alcohol use: Yes     Comment: BEER OCCASIONALLY    Drug use: Never    Sexual activity: Not on file   Other Topics Concern    Not on file   Social History Narrative    Not on file     Social Determinants of Health     Financial Resource Strain:     Difficulty of Paying Living Expenses:    Food Insecurity:     Worried About Running Out of Food in the Last Year:     [address] in the Last Year:    Transportation Needs:     Lack of Transportation (Medical):      Lack of Transportation (Non-Medical):    Physical Activity:     Days of Exercise per Week:     Minutes of Exercise per Session:    Stress:     Feeling of Stress :    Social Connections:     Frequency of Communication with Friends and Family:     Frequency of Social Gatherings with Friends and Family:     Attends Yazdanism Services:     Active Member of Clubs or Organizations:     Attends Club or Organization Meetings:     Marital Status:    Intimate Partner Violence:     Fear of Current or Ex-Partner:     Emotionally Abused:     Physically Abused:     Sexually Abused:        Vitals:    08/18/21 1258   BP: 138/64   Site: Left Upper Arm   Position: Sitting   Cuff Size: Medium Adult   Pulse: 63   Temp: 97.3 °F (36.3 °C)   TempSrc: Temporal   SpO2: 96%   Weight: 260 lb 8 oz (118.2 kg)   Height: 5' 9\" (1.753 m)       Physical Exam  Vitals and nursing note reviewed. Constitutional:       Appearance: Normal appearance. HENT:      Head: Normocephalic. Right Ear: Tympanic membrane and ear canal normal. There is no impacted cerumen. Left Ear: Tympanic membrane and ear canal normal. There is no impacted cerumen. Nose: Nose normal.      Mouth/Throat:      Mouth: Mucous membranes are dry. Eyes:      Extraocular Movements: Extraocular movements intact. Pupils: Pupils are equal, round, and reactive to light. Neck:      Vascular: No carotid bruit. Cardiovascular:      Rate and Rhythm: Normal rate and regular rhythm. Pulses: Normal pulses. Heart sounds: Normal heart sounds. No murmur heard. No friction rub. No gallop. Pulmonary:      Effort: Pulmonary effort is normal. No respiratory distress. Breath sounds: Normal breath sounds. No stridor. No wheezing, rhonchi or rales. Chest:      Chest wall: No tenderness. Abdominal:      General: Bowel sounds are normal. There is no distension. Palpations: Abdomen is soft. Musculoskeletal:         General: No swelling, tenderness, deformity or signs of injury. Cervical back: No rigidity. No muscular tenderness. Right lower leg: No edema. Left lower leg: No edema. Lymphadenopathy:      Cervical: No cervical adenopathy. Skin:     General: Skin is warm and dry. Capillary Refill: Capillary refill takes 2 to 3 seconds. Findings: No bruising, lesion or rash. Neurological:      General: No focal deficit present. Mental Status: He is alert and oriented to person, place, and time. Motor: No weakness. Gait: Gait normal.   Psychiatric:         Attention and Perception: Attention normal.         Mood and Affect: Mood normal.         Behavior: Behavior normal.         Thought Content: Thought content does not include homicidal or suicidal ideation. Thought content does not include homicidal or suicidal plan.        Visual inspection:  Deformity/amputation: absent  Skin lesions/pre-ulcerative calluses: absent  Edema: right- negative, left- negative    Sensory exam:  Monofilament sensation: normal  (minimum of 5 random plantar locations tested, avoiding callused areas - > 1 area with absence of sensation is + for neuropathy)    Plus at least one of the following:  Pulses: normal,   Pinprick: Intact         Seen By:  HENRRY Fuller - CNP

## 2021-08-18 NOTE — PROGRESS NOTES
Medicare Annual Wellness Visit  Name: Roxanna Mc Date: 2021   MRN: <[id number]> Sex: Male   Age: 66 y.o. Ethnicity: Non- / Non    : 1950 Race: White (non-)      Gama Ruvalcaba is here for Medicare AWV    Screenings for behavioral, psychosocial and functional/safety risks, and cognitive dysfunction are all negative except as indicated below. These results, as well as other patient data from the [address] form, are documented in Flowsheets linked to this Encounter. No Known Allergies      Prior to Visit Medications    Medication Sig Taking?  Authorizing Provider   metFORMIN (GLUCOPHAGE) 500 MG tablet Take 1 tablet by mouth 2 times daily  HENRRY Giordano CNP   furosemide (LASIX) 40 MG tablet Take 40 mg by mouth 2 times daily  Historical Provider, MD   clopidogrel (PLAVIX) 75 MG tablet Take 1 tablet by mouth daily  Historical Provider, MD   rivaroxaban (XARELTO) 20 MG TABS tablet Take 20 mg by mouth daily  Historical Provider, MD   lisinopril (PRINIVIL;ZESTRIL) 20 MG tablet Take 20 mg by mouth 2 times daily  Historical Provider, MD   Multiple Vitamins-Minerals (CENTRUM SILVER ADULT 50+ PO) Take 1 tablet by mouth daily  Historical Provider, MD   carvedilol (COREG) 25 MG tablet Take 25 mg by mouth 2 times daily  Historical Provider, MD   atorvastatin (LIPITOR) 80 MG tablet Take 80 mg by mouth daily  Historical Provider, MD   dilTIAZem (CARDIZEM CD) 120 MG extended release capsule Take 120 mg by mouth daily  Historical Provider, MD         Past Medical History:   Diagnosis Date    CAD (coronary artery disease)     [address] mellitus [address] [address]     Hyperlipidemia     Hypertension     Myocardial infarction [facility])        Past Surgical History:   Procedure Laterality Date    CAROTID STENT PLACEMENT      CATARACT REMOVAL Bilateral     CORONARY ARTERY BYPASS GRAFT  1997    KNEE ARTHROSCOPY Bilateral 2016         Family History   Problem Relation Age of Onset    Cancer Mother         STOMACH    Heart Attack Father     Coronary Art Dis Father     High Blood Pressure Father     High Cholesterol Father     Other Father         CHF    Heart Attack Brother     Cancer Brother         LIVER    Coronary Art Dis Brother     High Blood Pressure Brother     Stroke Brother     High Cholesterol Brother     Other Brother         CHF       CareTeam (Including outside providers/suppliers regularly involved in providing care):   Patient Care Team:  HENRRY Sherwood CNP as PCP - General (Nurse Practitioner)  HENRRY Sherwood CNP as PCP - Osiel Harvey Provider    Wt Readings from Last 3 Encounters:   08/18/21 260 lb 8 oz (118.2 kg)   08/18/21 260 lb 8 oz (118.2 kg)   04/14/21 270 lb 14.4 oz (122.9 kg)     Vitals:    08/18/21 1313   BP: 138/64   Site: Left Upper Arm   Position: Sitting   Cuff Size: Medium Adult   Pulse: 63   Temp: 97.3 °F (36.3 °C)   TempSrc: Temporal   SpO2: 96%   Weight: 260 lb 8 oz (118.2 kg)   Height: 5' 9\" (1.753 m)     Body mass index is 38.47 kg/m². Based upon direct observation of the patient, evaluation of cognition reveals recent and remote memory intact.     General Appearance: alert and oriented to person, place and time, well developed and well- nourished, in no acute distress  Skin: warm and dry, no rash or erythema  Head: normocephalic and atraumatic  Eyes: pupils equal, round, and reactive to light, extraocular eye movements intact, conjunctivae normal  ENT: tympanic membrane, external ear and ear canal normal bilaterally, nose without deformity, nasal mucosa and turbinates normal without polyps  Neck: supple and non-tender without mass, no thyromegaly or thyroid nodules, no cervical lymphadenopathy  Pulmonary/Chest: clear to auscultation bilaterally- no wheezes, rales or rhonchi, normal air movement, no respiratory distress  Cardiovascular: normal rate, regular rhythm, normal S1 and S2, no murmurs, rubs, clicks, or gallops, distal pulses intact, no carotid bruits  Abdomen: soft, non-tender, non-distended, normal bowel sounds, no masses or organomegaly  Extremities: no cyanosis, clubbing or edema  Musculoskeletal: normal range of motion, no joint swelling, deformity or tenderness  Neurologic: reflexes normal and symmetric, no cranial nerve deficit, gait, coordination and speech normal    Patient's complete Health Risk Assessment and screening values have been reviewed and are found in Flowsheets. The following problems were reviewed today and where indicated follow up appointments were made and/or referrals ordered. Positive Risk Factor Screenings with Interventions:            General Health and ACP:  General  In general, how would you say your health is?: Very Good  In the past 7 days, have you experienced any of the following?  New or Increased Pain, New or Increased Fatigue, Loneliness, Social Isolation, Stress or Anger?: None of These  Do you get the social and emotional support that you need?: (!) No  Do you have a Living Will?: (!) No  Advance Directives     Power of [address] [place] [address] Will ACP-Advance Directive ACP-Power of     Not on File Not on File Not on File Not on File      General Health Risk Interventions:  · No Living Will: Patient declines ACP discussion/assistance    Health Habits/Nutrition:  Health Habits/Nutrition  Do you exercise for at least 20 minutes 2-3 times per week?: (!) No  Have you lost any weight without trying in the past 3 months?: No  Do you eat only one meal per day?: No  Have you seen the dentist within the past year?: N/A - wear dentures  Body mass index: (!) 38.46  Health Habits/Nutrition Interventions:  · Inadequate physical activity:  educational materials provided to promote increased physical activity    Hearing/Vision:  No exam data present  Hearing/Vision  Do you or your family notice any trouble with your hearing that hasn't been managed with hearing aids?: No  Do you have difficulty driving, watching TV, or doing any of your daily activities because of your eyesight?: No  Have you had an eye exam within the past year?: (!) No  Hearing/Vision Interventions:  · Hearing concerns:  patient declines any further evaluation/treatment for hearing issues    Safety:  Safety  Do you have working smoke detectors?: Yes  Have all throw rugs been removed or fastened?: (!) No  Do you have non-slip mats or surfaces in all bathtubs/showers?: (!) No  Do all of your stairways have a railing or banister?: Yes  Are your doorways, halls and stairs free of clutter?: Yes  Do you always fasten your seatbelt when you are in a car?: Yes  Safety Interventions:  · Home safety tips provided     Personalized Preventive Plan   Current Health Maintenance Status  Immunization History   Administered Date(s) Administered    Td (Adult), 2 Lf Tetanus Toxoid, Pf (Td, Absorbed) 11/11/2020        Health Maintenance   Topic Date Due    AAA screen  Never done    Hepatitis C screen  Never done    Diabetic retinal exam  Never done    COVID-19 Vaccine (1) Never done    Colon cancer screen colonoscopy  Never done    Shingles Vaccine (1 of 2) Never done    Pneumococcal 65+ years Vaccine (1 of 1 - PPSV23) Never done   [employer] Visit (AWV)  Never done    DTaP/Tdap/Td vaccine (1 - Tdap) 11/12/2020    Flu vaccine (1) 09/01/2021    Diabetic microalbuminuria test  11/09/2021    A1C test (Diabetic or Prediabetic)  04/07/2022    Lipid screen  08/12/2022    Potassium monitoring  08/12/2022    Creatinine monitoring  08/12/2022    Diabetic foot exam  08/18/2022    Hepatitis A vaccine  Aged Out    Hib vaccine  Aged Out    Meningococcal (ACWY) vaccine  Aged Out     Recommendations for [employer] Due: see orders and patient instructions/AVS.  .   Recommended screening schedule for the next 5-10 years is provided to the patient in written form: see Patient Bernard Robles was seen today for medicare awv. Diagnoses and all orders for this visit:    Screening for rectal cancer  -     Cologuard (For External Results Only); Future    History of nicotine use  -     [place] screening for AAA; Future    Screening for AAA (abdominal aortic aneurysm)  -     [place] screening for AAA; Future    Routine general medical examination at a health care facility                 Obesity Counseling: Assessed behavioral health risks and factors affecting choice of behavior. Suggested weight control approaches, including dietary changes behavioral modification and follow up plan. Provided educational and support documentation. Time spent (minutes): 10  Cardiovascular Disease Risk Counseling: Assessed the patient's risk to develop cardiovascular disease and reviewed main risk factors. Reviewed steps to reduce disease risk including:   · Quitting tobacco use, reducing amount smoked, or not starting the habit  · Making healthy food choices  · Being physically active and gradualy increasing activity levels   · Reduce weight and determine a healthy BMI goal  · Monitor blood pressure and treat if higher than 140/90 mmHg  · Maintain blood total cholesterol levels under 5 mmol/l or 190 mg/dl  · Maintain LDL cholesterol levels under 3.0 mmol/l or 115 mg/dl   · Control blood glucose levels  · Consider taking aspirin (75 mg daily), once blood pressure is controlled   Provided a follow up plan.   Time spent (minutes): 10        Electronically signed by HENRRY Nolan CNP on 8/18/2021 at 1:28 PM

## 2021-08-18 NOTE — PATIENT INSTRUCTIONS
Patient Education        Learning About Coronary Artery Disease (CAD)  What is coronary artery disease? Coronary artery disease is a condition that occurs when plaque builds up in the arteries that bring oxygen-rich blood to your heart. Plaque is a fatty substance made of cholesterol, calcium, and other substances in the blood. This process is called hardening of the arteries, or atherosclerosis. What happens when you have coronary artery disease? · Plaque may narrow the coronary arteries. Narrowed arteries cause poor blood flow. This can lead to angina symptoms such as chest pain or discomfort. If blood flow is completely blocked, you could have a heart attack. · You can slow and reduce the risk of future problems by making changes in your lifestyle. These include quitting smoking and eating heart-healthy foods. · Treatment, along with changes in your lifestyle, can help you live a longer and healthier life. How can you prevent coronary artery disease? · Do not smoke. It may be the best thing you can do to prevent coronary artery disease. If you need help quitting, talk to your doctor about stop-smoking programs and medicines. These can increase your chances of quitting for good. · Be active. Try to do moderate activity at least 2½ hours a week. Or try to do vigorous activity at least 1¼ hours a week. You may want to walk or try other activities, such as running, swimming, cycling, or playing tennis or team sports. · Eat heart-healthy foods. Eat more fruits and vegetables and less food that contains saturated and trans fats. Limit alcohol, sodium, and sweets. · Stay at a healthy weight. Lose weight if you need to. · Manage other health problems such as diabetes, high blood pressure, and high cholesterol. How is coronary artery disease treated? · Your doctor will suggest that you make lifestyle changes.  For example, your doctor may ask you to eat healthy foods, quit smoking, lose extra weight, and be more active. · You will take medicines that help prevent a heart attack. · Your doctor may suggest a procedure to open narrowed or blocked arteries. This is called angioplasty. Or your doctor may suggest using healthy blood vessels to create detours around narrowed or blocked arteries. This is called bypass surgery. Follow-up care is a key part of your treatment and safety. Be sure to make and go to all appointments, and call your doctor if you are having problems. It's also a good idea to know your test results and keep a list of the medicines you take. Where can you learn more? Go to https://Universal World Entertainment LLCpesiOPTICAeweb.uiu. org and sign in to your RIDERS account. Enter [phone] in the Praedicat box to learn more about \"Learning About Coronary Artery Disease (CAD). \"     If you do not have an account, please click on the \"Sign Up Now\" link. Current as of: August 31, 2020               Content Version: 12.9  © 2006-2021 Mashwork. Care instructions adapted under license by [facility] ([facility]). If you have questions about a medical condition or this instruction, always ask your healthcare professional. [place] [address] any warranty or liability for your use of this information. Patient Education        Body Mass Index: Care Instructions  Your Care Instructions     Body mass index (BMI) can help you see if your weight is raising your risk for health problems. It uses a formula to compare how much you weigh with how tall you are. · A BMI lower than 18.5 is considered underweight. · A BMI between 18.5 and 24.9 is considered healthy. · A BMI between 25 and 29.9 is considered overweight. A BMI of 30 or higher is considered obese. If your BMI is in the normal range, it means that you have a lower risk for weight-related health problems.  If your BMI is in the overweight or obese range, you may be at increased risk for weight-related health problems, such as high blood pressure, heart disease, stroke, arthritis or joint pain, and diabetes. If your BMI is in the underweight range, you may be at increased risk for health problems such as fatigue, lower protection (immunity) against illness, muscle loss, bone loss, hair loss, and hormone problems. BMI is just one measure of your risk for weight-related health problems. You may be at higher risk for health problems if you are not active, you eat an unhealthy diet, or you drink too much alcohol or use tobacco products. Follow-up care is a key part of your treatment and safety. Be sure to make and go to all appointments, and call your doctor if you are having problems. It's also a good idea to know your test results and keep a list of the medicines you take. How can you care for yourself at home? · Practice healthy eating habits. This includes eating plenty of fruits, vegetables, whole grains, lean protein, and low-fat dairy. · If your doctor recommends it, get more exercise. Walking is a good choice. Bit by bit, increase the amount you walk every day. Try for at least 30 minutes on most days of the week. · Do not smoke. Smoking can increase your risk for health problems. If you need help quitting, talk to your doctor about stop-smoking programs and medicines. These can increase your chances of quitting for good. · Limit alcohol to 2 drinks a day for men and 1 drink a day for women. Too much alcohol can cause health problems. If you have a BMI higher than 25  · Your doctor may do other tests to check your risk for weight-related health problems. This may include measuring the distance around your waist. A waist measurement of more than 40 inches in men or 35 inches in women can increase the risk of weight-related health problems. · Talk with your doctor about steps you can take to stay healthy or improve your health.  You may need to make lifestyle changes to lose weight and stay healthy, such as changing your diet and getting regular exercise. If you have a BMI lower than 18.5  · Your doctor may do other tests to check your risk for health problems. · Talk with your doctor about steps you can take to stay healthy or improve your health. You may need to make lifestyle changes to gain or maintain weight and stay healthy, such as getting more healthy foods in your diet and doing exercises to build muscle. Where can you learn more? Go to https://chpeliu.ShutterCal. org and sign in to your Radio Waves account. Enter S176 in the Malesbanget box to learn more about \"Body Mass Index: Care Instructions. \"     If you do not have an account, please click on the \"Sign Up Now\" link. Current as of: March 17, 2021               Content Version: 12.9  © 1302-2840 Epicrisis. Care instructions adapted under license by [facility] ([facility]). If you have questions about a medical condition or this instruction, always ask your healthcare professional. [place] [address] any warranty or liability for your use of this information. Patient Education        Diabetes Foot Health: Care Instructions  Your Care Instructions     When you have diabetes, your feet need extra care and attention. Diabetes can damage the nerve endings and blood vessels in your feet, making you less likely to notice when your feet are injured. Diabetes also limits your body's ability to fight infection and get blood to areas that need it. If you get a minor foot injury, it could become an ulcer or a serious infection. With good foot care, you can prevent most of these problems. Caring for your feet can be quick and easy. Most of the care can be done when you are bathing or getting ready for bed. Follow-up care is a key part of your treatment and safety. Be sure to make and go to all appointments, and call your doctor if you are having problems.  It's also a good idea to know your test results and keep a list of the medicines you helps prevent bunions and blisters. ? Try on shoes while wearing the kind of socks you will usually wear with the shoes. ? Avoid plastic shoes. They may rub your feet and cause blisters. Good shoes should be made of materials that are flexible and breathable, such as leather or cloth. ? Break in new shoes slowly by wearing them for no more than an hour a day for several days. Take extra time to check your feet for red areas, blisters, or other problems after you wear new shoes. · Do not go barefoot. Do not wear sandals, and do not wear shoes with very thin soles. Thin soles are easy to puncture. They also do not protect your feet from hot pavement or cold weather. · Have your doctor check your feet during each visit. If you have a foot problem, see your doctor. Do not try to treat an early foot problem at home. Home remedies or treatments that you can buy without a prescription (such as corn removers) can be harmful. · Always get early treatment for foot problems. A minor irritation can lead to a major problem if not properly cared for early. When should you call for help? Call your doctor now or seek immediate medical care if:    · You have a foot sore, an ulcer or break in the skin that is not healing after 4 days, bleeding corns or calluses, or an ingrown toenail.     · You have blue or black areas, which can mean bruising or blood flow problems.     · You have peeling skin or tiny blisters between your toes or cracking or oozing of the skin.     · You have a fever for more than 24 hours and a foot sore.     · You have new numbness or tingling in your feet that does not go away after you move your feet or change positions.     · You have unexplained or unusual swelling of the foot or ankle. Watch closely for changes in your health, and be sure to contact your doctor if:    · You cannot do proper foot care. Where can you learn more? Go to https://cristina.health-partners. org and sign in to your Yatown account. Enter A739 in the [facility] box to learn more about \"Diabetes Foot Health: Care Instructions. \"     If you do not have an account, please click on the \"Sign Up Now\" link. Current as of: August 31, 2020               Content Version: 12.9  © 2006-2021 Healthwise, ThinkHR. Care instructions adapted under license by [facility] ([facility]). If you have questions about a medical condition or this instruction, always ask your healthcare professional. [address] any warranty or liability for your use of this information. Patient Education        Diabetes and Preventing Falls: Care Instructions  Your Care Instructions     If you are an older adult who has diabetes, you may have a higher risk of falling. Complications of diabetessuch as nerve damage, foot problems, and reduced visionmay increase your risk of a fall. Some of your medicines also may add to your risk. By making your home safer, you can lower your risk of falling. Doing things to prevent diabetes complications may also help to lower your risk. You can make your home safer with a few simple measures. Follow-up care is a key part of your treatment and safety. Be sure to make and go to all appointments, and call your doctor if you are having problems. It's also a good idea to know your test results and keep a list of the medicines you take. How can you care for yourself at home? Taking care of yourself  · Keep your blood sugar at a target level (which you set with your doctor). · Exercise regularly to improve your strength, muscle tone, and balance. Walk if you can. Swimming may be a good choice if you cannot walk easily. · Have your vision checked as often as your doctor recommends. It is usually once a year or more often if you have eye problems. · Know the side effects of the medicines you take. Ask your doctor or pharmacist whether the medicines you take can affect your balance.  Sleeping pills or sedatives can affect your balance. · Limit the amount of alcohol you drink. Alcohol can impair your balance and other senses. · Have your doctor check your feet during each visit. If you have a foot problem, see your doctor. Preventing falls at home  · Remove raised doorway thresholds, throw rugs, and clutter. Repair loose carpet or raised areas in the floor. · Move furniture and electrical cords to keep them out of walking paths. · Use nonskid floor wax, and wipe up spills right away, especially on ceramic tile floors. · If you use a walker or cane, put rubber tips on it. If you use crutches, clean the bottoms of them regularly with an abrasive pad, such as steel wool. · Keep your house well lit, especially Joslyn Player, and outside walkways. Use night-lights in areas such as hallways and bathrooms. Add extra light switches or use remote switches (such as switches that go on or off when you clap your hands) to make it easier to turn lights on if you have to get up during the night. · Install sturdy handrails on stairways. Put grab bars near your shower, bathtub, and toilet. · Store household items on low shelves so that you do not have to climb or reach high. Or use a reaching device that you can get at a medical supply store. If you have to climb for something, use a step stool with handrails, or ask someone to get it for you. · Keep a cordless phone and a flashlight with new batteries by your bed. If possible, put a phone in each of the main rooms of your house, or carry a cell phone in case you fall and cannot reach a phone. Or you can wear a device around your neck or wrist. You push a button that sends a signal for help. · Wear low-heeled shoes that fit well and give your feet good support. Use footwear with nonskid soles. Check the heels and soles of your shoes for wear. Repair or replace worn heels or soles. · Do not wear socks without shoes on wood floors.   · Walk on the grass when the sidewalks are slippery. If you live in an area that gets snow and ice in the winter, sprinkle salt on slippery steps and sidewalks. Where can you learn more? Go to https://cristina.Nobao Renewable Energy Holdings. org and sign in to your HouseCall account. Enter O463 in the [facility] box to learn more about \"Diabetes and Preventing Falls: Care Instructions. \"     If you do not have an account, please click on the \"Sign Up Now\" link. Current as of: December 7, 2020               Content Version: 12.9  © 2006-2021 TrueNorthLogic. Care instructions adapted under license by [facility] ([facility]). If you have questions about a medical condition or this instruction, always ask your healthcare professional. [address] any warranty or liability for your use of this information. Patient Education        Counting Carbohydrates for Diabetes: Care Instructions  Your Care Instructions     You don't have to eat special foods when you have diabetes. You just have to be careful to eat healthy foods. Carbohydrates (carbs) raise blood sugar higher and quicker than any other nutrient. Carbs are found in desserts, breads and cereals, and fruit. They're also in starchy vegetables. These include potatoes, corn, and grains such as rice and pasta. Carbs are also in milk and yogurt. The more carbs you eat at one time, the higher your blood sugar will rise. Spreading carbs all through the day helps keep your blood sugar levels within your target range. Counting carbs is one of the best ways to keep your blood sugar under control. If you use insulin, counting carbs helps you match the right amount of insulin to the number of grams of carbs in a meal. Then you can change your diet and insulin dose as needed. Testing your blood sugar several times a day can help you learn how carbs affect your blood sugar.   A registered dietitian or certified diabetes educator can help you plan meals and insulin:  · Look at labels on packaged foods. This can tell you how many carbs are in a serving. You can also use guides from the American Diabetes Association. · Be aware of portions, or serving sizes. If a package has two servings and you eat the whole package, you need to double the number of grams of carbohydrate listed for one serving. · Protein, fat, and fiber do not raise blood sugar as much as carbs do. If you eat a lot of these nutrients in a meal, your blood sugar will rise more slowly than it would otherwise. Eat from all food groups  · Eat at least three meals a day. · Plan meals to include food from all the food groups. The food groups include grains, fruits, dairy, proteins, and vegetables. · Talk to your dietitian or diabetes educator about ways to add limited amounts of sweets into your meal plan. · If you drink alcohol, talk to your doctor. It may not be recommended when you are taking certain diabetes medicines. Where can you learn more? Go to https://CrowdFlower.ZEFR. org and sign in to your Strutta account. Enter H982 in the Instacoach box to learn more about \"Counting Carbohydrates for Diabetes: Care Instructions. \"     If you do not have an account, please click on the \"Sign Up Now\" link. Current as of: August 31, 2020               Content Version: 12.9  © 6232-4202 Healthwise, Incorporated. Care instructions adapted under license by [facility] ([facility]). If you have questions about a medical condition or this instruction, always ask your healthcare professional. [place] [address] any warranty or liability for your use of this information. Patient Education        Learning About Meal Planning for Diabetes  Why plan your meals? Meal planning can be a key part of managing diabetes.  Planning meals and snacks with the right balance of carbohydrate, protein, and fat can help you keep your blood sugar at the target level you set with your doctor. You don't have to eat special foods. You can eat what your family eats, including sweets once in a while. But you do have to pay attention to how often you eat and how much you eat of certain foods. You may want to work with a dietitian or a certified diabetes educator. He or she can give you tips and meal ideas and can answer your questions about meal planning. This health professional can also help you reach a healthy weight if that is one of your goals. What plan is right for you? Your dietitian or diabetes educator may suggest that you start with the plate format or carbohydrate counting. The plate format  The plate format is a simple way to help you manage how you eat. You plan meals by learning how much space each food should take on a plate. Using the plate format helps you spread carbohydrate throughout the day. It can make it easier to keep your blood sugar level within your target range. It also helps you see if you're eating healthy portion sizes. To use the plate format, you put non-starchy vegetables on half your plate. Add meat or meat substitutes on one-quarter of the plate. Put a grain or starchy vegetable (such as brown rice or a potato) on the final quarter of the plate. You can add a small piece of fruit and some low-fat or fat-free milk or yogurt, depending on your carbohydrate goal for each meal.  Here are some tips for using the plate format:  · Make sure that you are not using an oversized plate. A 9-inch plate is best. Many restaurants use larger plates. · Get used to using the plate format at home. Then you can use it when you eat out. · Write down your questions about using the plate format. Talk to your doctor, a dietitian, or a diabetes educator about your concerns. Carbohydrate counting  With carbohydrate counting, you plan meals based on the amount of carbohydrate in each food. Carbohydrate raises blood sugar higher and more quickly than any other nutrient.  It is found in desserts, breads and cereals, and fruit. It's also found in starchy vegetables such as potatoes and corn, grains such as rice and pasta, and milk and yogurt. Spreading carbohydrate throughout the day helps keep your blood sugar levels within your target range. Your daily amount depends on several things, including your weight, how active you are, which diabetes medicines you take, and what your goals are for your blood sugar levels. A registered dietitian or diabetes educator can help you plan how much carbohydrate to include in each meal and snack. A guideline for your daily amount of carbohydrate is:  · 45 to 60 grams at each meal. That's about the same as 3 to 4 carbohydrate servings. · 15 to 20 grams at each snack. That's about the same as 1 carbohydrate serving. The Nutrition Facts label on packaged foods tells you how much carbohydrate is in a serving of the food. First, look at the serving size on the food label. Is that the amount you eat in a serving? All of the nutrition information on a food label is based on that serving size. So if you eat more or less than that, you'll need to adjust the other numbers. Total carbohydrate is the next thing you need to look for on the label. If you count carbohydrate servings, one serving of carbohydrate is 15 grams. For foods that don't come with labels, such as fresh fruits and vegetables, you'll need a guide that lists carbohydrate in these foods. Ask your doctor, dietitian, or diabetes educator about books or other nutrition guides you can use. If you take insulin, you need to know how many grams of carbohydrate are in a meal. This lets you know how much rapid-acting insulin to take before you eat. If you use an insulin pump, you get a constant rate of insulin during the day. So the pump must be programmed at meals to give you extra insulin to cover the rise in blood sugar after meals.   When you know how much carbohydrate you will eat, you can take the right amount of insulin. Or, if you always use the same amount of insulin, you need to make sure that you eat the same amount of carbohydrate at meals. If you need more help to understand carbohydrate counting and food labels, ask your doctor, dietitian, or diabetes educator. How can you plan healthy meals? Here are some tips to get started:  · Plan your meals a week at a time. Don't forget to include snacks too. · Use cookbooks or online recipes to plan several main meals. Plan some quick meals for busy nights. You also can double some recipes that freeze well. Then you can save half for other busy nights when you don't have time to cook. · Make sure you have the ingredients you need for your recipes. If you're running low on basic items, put these items on your shopping list too. · List foods that you use to make breakfasts, lunches, and snacks. List plenty of fruits and vegetables. · Post this list on the refrigerator. Add to it as you think of more things you need. · Take the list to the store to do your weekly shopping. Follow-up care is a key part of your treatment and safety. Be sure to make and go to all appointments, and call your doctor if you are having problems. It's also a good idea to know your test results and keep a list of the medicines you take. Where can you learn more? Go to https://Propableperomuloeb.[email or web address] org and sign in to your Eglue Business Technologies account. Enter E367 in the [facility] box to learn more about \"Learning About Meal Planning for Diabetes. \"     If you do not have an account, please click on the \"Sign Up Now\" link. Current as of: August 31, 2020               Content Version: 12.9  © 8829-1030 Healthwise, BlueSpace. Care instructions adapted under license by [facility] ([facility]).  If you have questions about a medical condition or this instruction, always ask your healthcare professional. [address] any warranty or liability for your use of this information.

## 2021-08-18 NOTE — PATIENT INSTRUCTIONS
Body Mass Index: Care Instructions  Your Care Instructions     Body mass index (BMI) can help you see if your weight is raising your risk for health problems. It uses a formula to compare how much you weigh with how tall you are. · A BMI lower than 18.5 is considered underweight. · A BMI between 18.5 and 24.9 is considered healthy. · A BMI between 25 and 29.9 is considered overweight. A BMI of 30 or higher is considered obese. If your BMI is in the normal range, it means that you have a lower risk for weight-related health problems. If your BMI is in the overweight or obese range, you may be at increased risk for weight-related health problems, such as high blood pressure, heart disease, stroke, arthritis or joint pain, and diabetes. If your BMI is in the underweight range, you may be at increased risk for health problems such as fatigue, lower protection (immunity) against illness, muscle loss, bone loss, hair loss, and hormone problems. BMI is just one measure of your risk for weight-related health problems. You may be at higher risk for health problems if you are not active, you eat an unhealthy diet, or you drink too much alcohol or use tobacco products. Follow-up care is a key part of your treatment and safety. Be sure to make and go to all appointments, and call your doctor if you are having problems. It's also a good idea to know your test results and keep a list of the medicines you take. How can you care for yourself at home? · Practice healthy eating habits. This includes eating plenty of fruits, vegetables, whole grains, lean protein, and low-fat dairy. · If your doctor recommends it, get more exercise. Walking is a good choice. Bit by bit, increase the amount you walk every day. Try for at least 30 minutes on most days of the week. · Do not smoke. Smoking can increase your risk for health problems. If you need help quitting, talk to your doctor about stop-smoking programs and medicines. These can increase your chances of quitting for good. · Limit alcohol to 2 drinks a day for men and 1 drink a day for women. Too much alcohol can cause health problems. If you have a BMI higher than 25  · Your doctor may do other tests to check your risk for weight-related health problems. This may include measuring the distance around your waist. A waist measurement of more than 40 inches in men or 35 inches in women can increase the risk of weight-related health problems. · Talk with your doctor about steps you can take to stay healthy or improve your health. You may need to make lifestyle changes to lose weight and stay healthy, such as changing your diet and getting regular exercise. If you have a BMI lower than 18.5  · Your doctor may do other tests to check your risk for health problems. · Talk with your doctor about steps you can take to stay healthy or improve your health. You may need to make lifestyle changes to gain or maintain weight and stay healthy, such as getting more healthy foods in your diet and doing exercises to build muscle. Where can you learn more? Go to https://Aegis LightwavenicoleSpartan Race.[employer]. org and sign in to your [employer] account. Enter S176 in the [facility] box to learn more about \"Body Mass Index: Care Instructions. \"     If you do not have an account, please click on the \"Sign Up Now\" link. Current as of: March 17, 2021               Content Version: 12.9  © 2537-5557 Healthwise, Incorporated. Care instructions adapted under license by [facility] ([facility]). If you have questions about a medical condition or this instruction, always ask your healthcare professional. [place] [address] any warranty or liability for your use of this information. DASH Diet: Care Instructions  Your Care Instructions     The DASH diet is an eating plan that can help lower your blood pressure. DASH stands for Dietary Approaches to Stop Hypertension.  Hypertension is high blood pressure. The DASH diet focuses on eating foods that are high in calcium, potassium, and magnesium. These nutrients can lower blood pressure. The foods that are highest in these nutrients are fruits, vegetables, low-fat dairy products, nuts, seeds, and legumes. But taking calcium, potassium, and magnesium supplements instead of eating foods that are high in those nutrients does not have the same effect. The DASH diet also includes whole grains, fish, and poultry. The DASH diet is one of several lifestyle changes your doctor may recommend to lower your high blood pressure. Your doctor may also want you to decrease the amount of sodium in your diet. Lowering sodium while following the DASH diet can lower blood pressure even further than just the DASH diet alone. Follow-up care is a key part of your treatment and safety. Be sure to make and go to all appointments, and call your doctor if you are having problems. It's also a good idea to know your test results and keep a list of the medicines you take. How can you care for yourself at home? Following the DASH diet  · Eat 4 to 5 servings of fruit each day. A serving is 1 medium-sized piece of fruit, ½ cup chopped or canned fruit, 1/4 cup dried fruit, or 4 ounces (½ cup) of fruit juice. Choose fruit more often than fruit juice. · Eat 4 to 5 servings of vegetables each day. A serving is 1 cup of lettuce or raw leafy vegetables, ½ cup of chopped or cooked vegetables, or 4 ounces (½ cup) of vegetable juice. Choose vegetables more often than vegetable juice. · Get 2 to 3 servings of low-fat and fat-free dairy each day. A serving is 8 ounces of milk, 1 cup of yogurt, or 1 ½ ounces of cheese. · Eat 6 to 8 servings of grains each day. A serving is 1 slice of bread, 1 ounce of dry cereal, or ½ cup of cooked rice, pasta, or cooked cereal. Try to choose whole-grain products as much as possible. · Limit lean meat, poultry, and fish to 2 servings each day.  A serving is garbanzo or kidney beans to salads. Make burritos and tacos with mashed vieyra beans or black beans. Where can you learn more? Go to https://DNAdigestpebakariTradeBriefs.[employer]. org and sign in to your [employer] account. Enter G582 in the [facility] box to learn more about \"DASH Diet: Care Instructions. \"     If you do not have an account, please click on the \"Sign Up Now\" link. Current as of: August 31, 2020               Content Version: 12.9  © 7271-4013 Healthwise, [employer]. Care instructions adapted under license by [facility] ([facility]). If you have questions about a medical condition or this instruction, always ask your healthcare professional. [address] any warranty or liability for your use of this information. Personalized Preventive Plan for Adrienne Limb - 8/18/2021  Medicare offers a range of preventive health benefits. Some of the tests and screenings are paid in full while other may be subject to a deductible, co-insurance, and/or copay. Some of these benefits include a comprehensive review of your medical history including lifestyle, illnesses that may run in your family, and various assessments and screenings as appropriate. After reviewing your medical record and screening and assessments performed today your provider may have ordered immunizations, labs, imaging, and/or referrals for you. A list of these orders (if applicable) as well as your Preventive Care list are included within your After Visit Summary for your review. Other Preventive Recommendations:    · A preventive eye exam performed by an eye specialist is recommended every 1-2 years to screen for glaucoma; cataracts, macular degeneration, and other eye disorders. · A preventive dental visit is recommended every 6 months. · Try to get at least 150 minutes of exercise per week or 10,000 steps per day on a pedometer .   · Order or download the FREE \"Exercise & Physical Activity: Your Everyday Guide\" from [employer]. Call 5-011-200-1997 or search [employer] online. · You need 1042-9470 mg of calcium and 9919-1485 IU of vitamin D per day. It is possible to meet your calcium requirement with diet alone, but a vitamin D supplement is usually necessary to meet this goal.  · When exposed to the sun, use a sunscreen that protects against both UVA and UVB radiation with an SPF of 30 or greater. Reapply every 2 to 3 hours or after sweating, drying off with a towel, or swimming. · Always wear a seat belt when traveling in a car. Always wear a helmet when riding a bicycle or motorcycle.

## 2021-08-31 NOTE — TELEPHONE ENCOUNTER
----- Message from Deborah Rocha sent at 8/31/2021 10:59 AM EDT -----  Subject: Message to Provider    QUESTIONS  Information for Provider? Patient returning call to Rob Orellana. Office was busy advised patient she would call back. ---------------------------------------------------------------------------  --------------  Josseline Meo INFO  What is the best way for the office to contact you? OK to leave message on   voicemail  Preferred Call Back Phone Number? [phone]  ---------------------------------------------------------------------------  --------------  SCRIPT ANSWERS  Relationship to Patient?  Self

## 2021-09-01 NOTE — TELEPHONE ENCOUNTER
Pt.. advised his Cologuard result is positive. He needs to have a colonoscopy. He has had a scope in the past , He will go back to Dr. Robinson Brambila in [place], [place]. Everardo please review and sign pended order.      Dub Mins to complete the referral.

## 2021-11-08 NOTE — TELEPHONE ENCOUNTER
I spoke with patient and he stated he does not want to have this US of Aorta. Is it ok to cancel the order since patient refuses?

## 2022-01-01 ENCOUNTER — OFFICE VISIT (OUTPATIENT)
Dept: FAMILY MEDICINE CLINIC | Age: 72
End: 2022-01-01
Payer: MEDICARE

## 2022-01-01 ENCOUNTER — NURSE ONLY (OUTPATIENT)
Dept: FAMILY MEDICINE CLINIC | Age: 72
End: 2022-01-01
Payer: MEDICARE

## 2022-01-01 ENCOUNTER — TELEPHONE (OUTPATIENT)
Dept: PRIMARY CARE CLINIC | Age: 72
End: 2022-01-01

## 2022-01-01 VITALS
HEIGHT: 69 IN | DIASTOLIC BLOOD PRESSURE: 78 MMHG | TEMPERATURE: 97.8 F | OXYGEN SATURATION: 99 % | BODY MASS INDEX: 41.52 KG/M2 | WEIGHT: 280.3 LBS | HEART RATE: 90 BPM | SYSTOLIC BLOOD PRESSURE: 138 MMHG

## 2022-01-01 VITALS
SYSTOLIC BLOOD PRESSURE: 146 MMHG | OXYGEN SATURATION: 97 % | TEMPERATURE: 96.8 F | HEART RATE: 104 BPM | HEIGHT: 69 IN | WEIGHT: 277.7 LBS | BODY MASS INDEX: 41.13 KG/M2 | DIASTOLIC BLOOD PRESSURE: 82 MMHG

## 2022-01-01 DIAGNOSIS — Z87.442 HISTORY OF KIDNEY STONES: ICD-10-CM

## 2022-01-01 DIAGNOSIS — E11.9 TYPE 2 DIABETES MELLITUS WITHOUT COMPLICATION, WITHOUT LONG-TERM CURRENT USE OF INSULIN (HCC): ICD-10-CM

## 2022-01-01 DIAGNOSIS — Z13.6 SCREENING FOR AAA (ABDOMINAL AORTIC ANEURYSM): ICD-10-CM

## 2022-01-01 DIAGNOSIS — I10 ESSENTIAL HYPERTENSION: ICD-10-CM

## 2022-01-01 DIAGNOSIS — I25.2 PERSONAL HISTORY OF MI (MYOCARDIAL INFARCTION): ICD-10-CM

## 2022-01-01 DIAGNOSIS — E78.01 FAMILIAL HYPERCHOLESTEROLEMIA: ICD-10-CM

## 2022-01-01 DIAGNOSIS — I25.119 CORONARY ARTERY DISEASE WITH ANGINA PECTORIS, UNSPECIFIED VESSEL OR LESION TYPE, UNSPECIFIED WHETHER NATIVE OR TRANSPLANTED HEART (HCC): ICD-10-CM

## 2022-01-01 DIAGNOSIS — Z12.5 ENCOUNTER FOR PROSTATE CANCER SCREENING: ICD-10-CM

## 2022-01-01 DIAGNOSIS — E11.9 TYPE 2 DIABETES MELLITUS WITHOUT COMPLICATION, WITHOUT LONG-TERM CURRENT USE OF INSULIN (HCC): Primary | ICD-10-CM

## 2022-01-01 DIAGNOSIS — R30.0 DYSURIA: Primary | ICD-10-CM

## 2022-01-01 DIAGNOSIS — R30.0 DYSURIA: ICD-10-CM

## 2022-01-01 DIAGNOSIS — Z87.891 HISTORY OF NICOTINE USE: ICD-10-CM

## 2022-01-01 DIAGNOSIS — Z00.00 ROUTINE GENERAL MEDICAL EXAMINATION AT A HEALTH CARE FACILITY: ICD-10-CM

## 2022-01-01 LAB
ALBUMIN SERPL-MCNC: 4.2 G/DL (ref 3.5–5.2)
ALP BLD-CCNC: 99 U/L (ref 40–129)
ALT SERPL-CCNC: 12 U/L (ref 0–40)
ANION GAP SERPL CALCULATED.3IONS-SCNC: 13 MMOL/L (ref 7–16)
AST SERPL-CCNC: 22 U/L (ref 0–39)
BACTERIA: NORMAL /HPF
BASOPHILS ABSOLUTE: 0.09 E9/L (ref 0–0.2)
BASOPHILS RELATIVE PERCENT: 0.8 % (ref 0–2)
BILIRUB SERPL-MCNC: 0.5 MG/DL (ref 0–1.2)
BILIRUBIN URINE: NEGATIVE
BILIRUBIN, POC: NEGATIVE
BLOOD URINE, POC: NORMAL
BLOOD, URINE: NEGATIVE
BUN BLDV-MCNC: 21 MG/DL (ref 6–23)
CALCIUM SERPL-MCNC: 10.1 MG/DL (ref 8.6–10.2)
CHLORIDE BLD-SCNC: 104 MMOL/L (ref 98–107)
CHOLESTEROL, TOTAL: 132 MG/DL (ref 0–199)
CLARITY, POC: NORMAL
CLARITY: CLEAR
CO2: 27 MMOL/L (ref 22–29)
COLOR, POC: NORMAL
COLOR: YELLOW
CREAT SERPL-MCNC: 1.2 MG/DL (ref 0.7–1.2)
EOSINOPHILS ABSOLUTE: 0.43 E9/L (ref 0.05–0.5)
EOSINOPHILS RELATIVE PERCENT: 3.7 % (ref 0–6)
GFR AFRICAN AMERICAN: >60
GFR NON-AFRICAN AMERICAN: 59 ML/MIN/1.73
GLUCOSE FASTING: 134 MG/DL (ref 74–99)
GLUCOSE URINE, POC: NEGATIVE
GLUCOSE URINE: NEGATIVE MG/DL
HBA1C MFR BLD: 6.2 % (ref 4–5.6)
HCT VFR BLD CALC: 39.5 % (ref 37–54)
HDLC SERPL-MCNC: 36 MG/DL
HEMOGLOBIN: 12.2 G/DL (ref 12.5–16.5)
IMMATURE GRANULOCYTES #: 0.04 E9/L
IMMATURE GRANULOCYTES %: 0.3 % (ref 0–5)
KETONES, POC: NEGATIVE
KETONES, URINE: NEGATIVE MG/DL
LDL CHOLESTEROL CALCULATED: 69 MG/DL (ref 0–99)
LEUKOCYTE EST, POC: NEGATIVE
LEUKOCYTE ESTERASE, URINE: ABNORMAL
LYMPHOCYTES ABSOLUTE: 2.06 E9/L (ref 1.5–4)
LYMPHOCYTES RELATIVE PERCENT: 17.7 % (ref 20–42)
MCH RBC QN AUTO: 29.8 PG (ref 26–35)
MCHC RBC AUTO-ENTMCNC: 30.9 % (ref 32–34.5)
MCV RBC AUTO: 96.6 FL (ref 80–99.9)
MICROALBUMIN UR-MCNC: 965.6 MG/L
MONOCYTES ABSOLUTE: 0.99 E9/L (ref 0.1–0.95)
MONOCYTES RELATIVE PERCENT: 8.5 % (ref 2–12)
NEUTROPHILS ABSOLUTE: 8.05 E9/L (ref 1.8–7.3)
NEUTROPHILS RELATIVE PERCENT: 69 % (ref 43–80)
NITRITE, POC: NEGATIVE
NITRITE, URINE: NEGATIVE
PDW BLD-RTO: 13.7 FL (ref 11.5–15)
PH UA: 7 (ref 5–9)
PH, POC: 6.5
PLATELET # BLD: 286 E9/L (ref 130–450)
PMV BLD AUTO: 10.4 FL (ref 7–12)
POTASSIUM SERPL-SCNC: 4.4 MMOL/L (ref 3.5–5)
PROTEIN UA: 100 MG/DL
PROTEIN, POC: NORMAL
RBC # BLD: 4.09 E12/L (ref 3.8–5.8)
RBC UA: NORMAL /HPF (ref 0–2)
SODIUM BLD-SCNC: 144 MMOL/L (ref 132–146)
SPECIFIC GRAVITY UA: 1.01 (ref 1–1.03)
SPECIFIC GRAVITY, POC: 1.03
TOTAL PROTEIN: 7.3 G/DL (ref 6.4–8.3)
TRIGL SERPL-MCNC: 134 MG/DL (ref 0–149)
URINE CULTURE, ROUTINE: NORMAL
UROBILINOGEN, POC: NORMAL
UROBILINOGEN, URINE: 0.2 E.U./DL
VLDLC SERPL CALC-MCNC: 27 MG/DL
WBC # BLD: 11.7 E9/L (ref 4.5–11.5)
WBC UA: NORMAL /HPF (ref 0–5)

## 2022-01-01 PROCEDURE — 1123F ACP DISCUSS/DSCN MKR DOCD: CPT | Performed by: NURSE PRACTITIONER

## 2022-01-01 PROCEDURE — 3044F HG A1C LEVEL LT 7.0%: CPT | Performed by: NURSE PRACTITIONER

## 2022-01-01 PROCEDURE — 36415 COLL VENOUS BLD VENIPUNCTURE: CPT | Performed by: NURSE PRACTITIONER

## 2022-01-01 PROCEDURE — 99213 OFFICE O/P EST LOW 20 MIN: CPT | Performed by: NURSE PRACTITIONER

## 2022-01-01 PROCEDURE — 99214 OFFICE O/P EST MOD 30 MIN: CPT | Performed by: NURSE PRACTITIONER

## 2022-01-01 PROCEDURE — 99999 PR OFFICE/OUTPT VISIT,PROCEDURE ONLY: CPT | Performed by: NURSE PRACTITIONER

## 2022-01-01 PROCEDURE — 81002 URINALYSIS NONAUTO W/O SCOPE: CPT | Performed by: NURSE PRACTITIONER

## 2022-01-01 RX ORDER — SULFAMETHOXAZOLE AND TRIMETHOPRIM 800; 160 MG/1; MG/1
1 TABLET ORAL 2 TIMES DAILY
Qty: 14 TABLET | Refills: 0 | Status: SHIPPED | OUTPATIENT
Start: 2022-01-01 | End: 2022-01-01

## 2022-01-01 RX ORDER — PHENAZOPYRIDINE HYDROCHLORIDE 100 MG/1
100 TABLET, FILM COATED ORAL 3 TIMES DAILY PRN
Qty: 6 TABLET | Refills: 0 | Status: SHIPPED | OUTPATIENT
Start: 2022-01-01 | End: 2022-01-01

## 2022-01-01 SDOH — ECONOMIC STABILITY: FOOD INSECURITY: WITHIN THE PAST 12 MONTHS, YOU WORRIED THAT YOUR FOOD WOULD RUN OUT BEFORE YOU GOT MONEY TO BUY MORE.: NEVER TRUE

## 2022-01-01 SDOH — ECONOMIC STABILITY: FOOD INSECURITY: WITHIN THE PAST 12 MONTHS, THE FOOD YOU BOUGHT JUST DIDN'T LAST AND YOU DIDN'T HAVE MONEY TO GET MORE.: NEVER TRUE

## 2022-01-01 ASSESSMENT — ENCOUNTER SYMPTOMS
CONSTIPATION: 0
BACK PAIN: 0
COUGH: 0
SHORTNESS OF BREATH: 0
ABDOMINAL PAIN: 0
VOMITING: 0
BLOOD IN STOOL: 0
VOICE CHANGE: 0
NAUSEA: 0
WHEEZING: 0
DIARRHEA: 0
CHEST TIGHTNESS: 0
TROUBLE SWALLOWING: 0

## 2022-01-01 ASSESSMENT — PATIENT HEALTH QUESTIONNAIRE - PHQ9
SUM OF ALL RESPONSES TO PHQ QUESTIONS 1-9: 0
1. LITTLE INTEREST OR PLEASURE IN DOING THINGS: 0
SUM OF ALL RESPONSES TO PHQ QUESTIONS 1-9: 0
SUM OF ALL RESPONSES TO PHQ9 QUESTIONS 1 & 2: 0
SUM OF ALL RESPONSES TO PHQ QUESTIONS 1-9: 0
2. FEELING DOWN, DEPRESSED OR HOPELESS: 0
SUM OF ALL RESPONSES TO PHQ QUESTIONS 1-9: 0

## 2022-01-01 ASSESSMENT — SOCIAL DETERMINANTS OF HEALTH (SDOH): HOW HARD IS IT FOR YOU TO PAY FOR THE VERY BASICS LIKE FOOD, HOUSING, MEDICAL CARE, AND HEATING?: NOT HARD AT ALL

## 2022-01-07 NOTE — TELEPHONE ENCOUNTER
Asking for new handicap placard, even though his does not  until end of Mar. 2022. Pt. Will pick this up Monday.

## 2022-02-22 NOTE — PATIENT INSTRUCTIONS
Patient Education        Home Blood Pressure Test: About This Test  What is it? A home blood pressure test allows you to keep track of your blood pressure at home. Blood pressure is a measure of the force of blood against the walls of your arteries. Blood pressure readings include two numbers, such as 130/80 (say \"130 over 80\"). The first number is the systolic pressure. The second number is the diastolic pressure. Why is this test done? You may do this test at home to:  · Find out if you have high blood pressure. · Track your blood pressure if you have high blood pressure. · Track how well medicine is working to reduce high blood pressure. · Check how lifestyle changes, such as weight loss and exercise, are affecting blood pressure. How do you prepare for the test?  For at least 30 minutes before you take your blood pressure, don't exercise, drink caffeine, or smoke. Empty your bladder before the test. Sit quietly with your back straight and both feet on the floor for at least 5 minutes. This helps you take your blood pressure while you feel comfortable and relaxed. How is the test done? · If your doctor recommends it, take your blood pressure twice a day. Take it in the morning and evening. · Sit with your arm slightly bent and resting on a table so that your upper arm is at the same level as your heart. · Use the same arm each time you take your blood pressure. · Place the blood pressure cuff on the bare skin of your upper arm. You may have to roll up your sleeve, remove your arm from the sleeve, or take your shirt off. · Wrap the blood pressure cuff around your upper arm so that the lower edge of the cuff is about 1 inch above the bend of your elbow. · Do not move, talk, or text while you take your blood pressure. Follow the instructions that came with your blood pressure monitor. They might be different from the following. · Press the on/off button on the automatic monitor.  Then you may and reduce the risk of future problems by making changes in your lifestyle. These include quitting smoking and eating heart-healthy foods. · Treatment, along with changes in your lifestyle, can help you live a longer and healthier life. How can you prevent coronary artery disease? · Do not smoke. It may be the best thing you can do to prevent coronary artery disease. If you need help quitting, talk to your doctor about stop-smoking programs and medicines. These can increase your chances of quitting for good. · Be active. Try to do moderate activity at least 2½ hours a week. Or try to do vigorous activity at least 1¼ hours a week. You may want to walk or try other activities, such as running, swimming, cycling, or playing tennis or team sports. · Eat heart-healthy foods. Eat more fruits and vegetables and less food that contains saturated and trans fats. Limit alcohol, sodium, and sweets. · Stay at a healthy weight. Lose weight if you need to. · Manage other health problems such as diabetes, high blood pressure, and high cholesterol. How is coronary artery disease treated? · Your doctor will suggest that you make lifestyle changes. For example, your doctor may ask you to eat healthy foods, quit smoking, lose extra weight, and be more active. · You will take medicines that help prevent a heart attack. · Your doctor may suggest a procedure to open narrowed or blocked arteries. This is called angioplasty. Or your doctor may suggest using healthy blood vessels to create detours around narrowed or blocked arteries. This is called bypass surgery. Follow-up care is a key part of your treatment and safety. Be sure to make and go to all appointments, and call your doctor if you are having problems. It's also a good idea to know your test results and keep a list of the medicines you take. Where can you learn more? Go to https://cristina.[employer]nx. org and sign in to your [employer] account.  Enter [phone] in the Search Health Information box to learn more about \"Learning About Coronary Artery Disease (CAD). \"     If you do not have an account, please click on the \"Sign Up Now\" link. Current as of: April 29, 2021               Content Version: 13.1  © 0972-5875 Healthwise, Incorporated. Care instructions adapted under license by [facility] ([facility]). If you have questions about a medical condition or this instruction, always ask your healthcare professional. [address] any warranty or liability for your use of this information.

## 2022-02-22 NOTE — PROGRESS NOTES
Curt Funez : 1950 Sex: male  Age: 66 y.o. Chief Complaint   Patient presents with    Discuss Labs       Assessment and Plan:    Sameer Abacra was seen today for discuss labs. Diagnoses and all orders for this visit:    Type 2 diabetes mellitus without complication, without long-term current use of insulin (HCC)  -     Hemoglobin A1C; Future  -     Microalbumin, Ur; Future  -     Urinalysis; Future  -     HM DIABETES FOOT EXAM    Familial hypercholesterolemia  -     Lipid Panel; Future    Essential hypertension  -     CBC with Auto Differential; Future  -     Comprehensive Metabolic Panel, Fasting; Future  -     Urinalysis; Future    History of nicotine use    Screening for AAA (abdominal aortic aneurysm)    Routine general medical examination at a health care facility    Coronary artery disease with angina pectoris, unspecified vessel or lesion type, unspecified whether native or transplanted heart [facility])    Personal history of MI (myocardial infarction)    BMI 40.0-44.9, adult ([facility] [address]    Encounter for prostate cancer screening  -     PSA Screening; Future    Other orders  -     metFORMIN (GLUCOPHAGE) 500 MG tablet; Take 1 tablet by mouth 2 times daily        USPTF:    (2015 Colorectal Cancer: Screening --Adults aged 48 to 76 years  Grade: A (Recommended) recommends screening for colorectal cancer starting at age 48 years and continuing until age 76 years.     (138/78 B/P) High Blood Pressure: Screening and Home Monitoring -- Adults  Grade: A (Recommended) recommends screening for high blood pressure in ages 25 years or older. obtain measurements outside of the clinical setting for diagnostic confirmation before starting treatment.  Annual screening for adults aged 36 years or older or those who are at increased risk for blood pressure    (, TRIG 134, LDL 69)  Lipid Disorders in Adults: Screening -- Men 28 and Older  Grade: A (Recommended) recommends screening men aged 28 and older for lipid disorders. (Social) Alcohol Misuse: Screening and Behavioral Counseling Interventions in Primary Care -- Adults  Grade: B (Recommended) recommends that clinicians screen adults aged 25 years or older for alcohol misuse and provide persons engaged in risky or hazardous drinking with brief behavioral counseling interventions to reduce alcohol misuse. (AIC 6.2) Abnormal Blood Glucose and Type 2 Diabetes Mellitus: Screening -- Adults aged 36 to 79 years who are overweight or obese Grade: B (Recommended)    (BMI 41.39)  Obesity: Screening for and Management of-- All Adults  Grade: B(Recommended) recommends screening all adults for obesity. Clinicians should offer or refer patients with a body mass index (BMI) of 30 kg/m2 or higher to intensive, multicomponent behavioral interventions. (Steady Gait)  Fall Prevention -- Exercise/Physical Therapy: Community-dwelling Adults 72 Years or Older, Increased Risk for Falls   Grade: B (Recommended) recommends exercise or physical therapy to prevent falls in community-dwelling adults aged 72 years or older who are at increased risk for falls. (Non reported or noted today)  Depression: Screening -- General adult population, including pregnant and postpartum women  Grade: B(Recommended) recommends screening for depression in the general adult population,  Screening should be implemented with adequate systems in place to ensure accurate diagnosis, effective treatment, and appropriate follow-up.     (2019) Glaucoma: Screening - Adults and Diabetic Eye Exam     (2020) Coronary Heart Disease: Screening with Electrocardiography--Adults at Low Risk  Grade: D (Not Recommended)     (  ) Prostate Cancer: Prostate-Specific Antigen (PSA)-Based Screening -- All Men   PSA  2.0  May 2020  PSA  1.25 Aug 12 2021    Sleep study in October mod to severe and they wanted to do a hospital one and he refused    Sees Cardiology No med changes and his next appt is slated for June 1 2021   We need documentation from   Dr. Korin Vincent. [facility] DO  [address] [phone]      Educational materials printed for patient's review and were included in patient instructions on his After Visit Summary and given to patient at the end of visit. Counseled regarding above diagnosis, including possible risks and complications,  especially if left uncontrolled. Counseled regarding the possible side effects, risks, benefits and alternatives to treatment; patient and/or guardian verbalizes understanding, agrees, feels comfortable with and wishes to proceed with above treatment plan. Advised patient to call with any new medication issues, and read all Rx info from pharmacy to assure aware of all possible risks and side effects of medication before taking. Reviewed age and gender appropriate health screening exams and vaccinations. Advised patient regarding importance of keeping up with recommended health maintenance and to schedule as soon as possible if overdue, as this is important in assessing for undiagnosed pathology, especially cancer, as well as protecting against potentially harmful/life threatening disease. Patient verbalizes understanding and agrees with above counseling, assessment and plan. All questions answered. On 02/22/22 I have spent 30 reviewing previous notes, test results and face to face with the patient discussing the diagnosis and importance of compliance with the treatment plan as well as documenting on the day of the visit. Educational materials exercises printed for patient's review and were included in patient instructions on their After Visit Summary and given to patient at the end of visit.      Return in about 6 months (around 8/22/2022) for Routine Visit with Labs. Zoie Hsieh has really done well turnaround his A1c is now on point and he is taking his medications well.  Unfortunately he has gained weight but whether has not permitted for him to do much other than sit around the house. He  presents today for evaluation and management of chronic medical problems. Current medication list reviewed. The patient is tolerating all medications well without adverse events or known side effects. The patient does understand the risk and benefits of the prescribed medications. The patient is not up-to-date on all age-appropriate wellness issues. Patient denies any reccent hospitalizations or ER visit. It is so nice to talk to him now he has hearing aids and I do not have to yell. No Acute Complaints reported:      CHRONIC CONDITION:    DM: Mild intensity but controlled on  metFORMIN (GLUCOPHAGE) 500 MG tablet, Take 500 mg by mouth 2 times daily, Disp: , Rfl: , remains without symptoms, no weight loss, no increase in thirst, or urination and no low blood sugar incidents. No reports of poor circulation or issues with feet ulceration or injury. Is better when compliant with diet and medications. Vision Exam Up to Date:                               Y    2019  Currently on an ACE:                                    Y  Most Recent Hgb A1c <7:                             Y    6.2 AIC    Microalbumin Test Reviewed:                       Y   Foot Exam Up To Date:                                 Y 2022    Flu Shot Up To Date:                                     Y    2021  Has patient been seen by Neurology            N   Currently on a Statin                                     Y       HTN: Stable hypertension, Hx of CABG controlled on   furosemide (LASIX) 40 MG tablet, Take 40 mg by mouth 2 times daily, Disp: , Rfl:   clopidogrel (PLAVIX) 75 MG tablet, Take 1 tablet by mouth daily, Disp: , Rfl:   rivaroxaban (XARELTO) 20 MG TABS tablet, Take 20 mg by mouth daily, Disp: , Rfl:   lisinopril (PRINIVIL;ZESTRIL) 20 MG tablet, Take 20 mg by mouth 2 times daily, Disp: , Rfl:   carvedilol (COREG) 25 MG tablet, Take 25 mg by mouth 2 times daily, Disp: , Rfl:   dilTIAZem (CARDIZEM CD) 120 MG extended release capsule, Take 120 mg by mouth daily, Disp: , Rfl: remains at a mild intensity but overall good control, without symptoms, no ringing in the ears, no headaches and no nose bleeds. Better on medications. CAD and CHF: Patient currently on a Betablocker and an ACE for LVSD. Tobacco Use: N History of     Flu Shot Up To Date: N     Meds Listed in Medical Record: Y     Hyperlipidemia: Mild in intensity but controlled on  atorvastatin (LIPITOR) 80 MG tablet, Take 80 mg by mouth daily, Disp: , Rfl:  without symptoms, no complications with dietary treatment regimen reporting no side effects or intolereances. Compliant with treatment and diet. No muscle aches, new joint pains or abd pain. Review of Systems   Constitutional: Negative for activity change, chills, diaphoresis, fatigue, fever and unexpected weight change. HENT: Negative for trouble swallowing and voice change. Eyes: Negative for visual disturbance. Respiratory: Negative for cough, chest tightness, shortness of breath and wheezing. Cardiovascular: Negative for chest pain, palpitations and leg swelling. Gastrointestinal: Negative for abdominal pain, blood in stool, constipation, diarrhea, nausea and vomiting. Endocrine: Negative for polydipsia, polyphagia and polyuria. Genitourinary: Negative for dysuria, enuresis, frequency and hematuria. Musculoskeletal: Negative for arthralgias, back pain, gait problem, joint swelling, myalgias and neck stiffness. Skin: Negative for rash. Neurological: Negative for dizziness, seizures, syncope, facial asymmetry, weakness, light-headedness, numbness and headaches. Hematological: Does not bruise/bleed easily. Psychiatric/Behavioral: Negative for behavioral problems, confusion, hallucinations and suicidal ideas. The patient is not nervous/anxious.           Current Outpatient Medications:     metFORMIN (GLUCOPHAGE) 500 MG tablet, Take 1 tablet by mouth 2 times daily, Disp: 180 tablet, Rfl: 1    Handicap Placard MISC, by Does not apply route Valid 1/7/22 through 1/7/27., Disp: 1 each, Rfl: 0    furosemide (LASIX) 40 MG tablet, Take 40 mg by mouth 2 times daily, Disp: , Rfl:     rivaroxaban (XARELTO) 20 MG TABS tablet, Take 20 mg by mouth daily, Disp: , Rfl:     lisinopril (PRINIVIL;ZESTRIL) 20 MG tablet, Take 20 mg by mouth 2 times daily, Disp: , Rfl:     Multiple Vitamins-Minerals (CENTRUM SILVER ADULT 50+ PO), Take 1 tablet by mouth daily, Disp: , Rfl:     carvedilol (COREG) 25 MG tablet, Take 25 mg by mouth 2 times daily, Disp: , Rfl:     atorvastatin (LIPITOR) 80 MG tablet, Take 80 mg by mouth daily, Disp: , Rfl:     dilTIAZem (CARDIZEM CD) 120 MG extended release capsule, Take 120 mg by mouth daily, Disp: , Rfl:   No Known Allergies    Past Medical History:   Diagnosis Date    CAD (coronary artery disease)     Diabetes mellitus ([facility] [address]     Hyperlipidemia     Hypertension     Myocardial infarction [facility])      Past Surgical History:   Procedure Laterality Date    CAROTID STENT PLACEMENT      CATARACT REMOVAL Bilateral     CORONARY ARTERY BYPASS GRAFT  12/01/1997    KNEE ARTHROSCOPY Bilateral 09/02/2016     Family History   Problem Relation Age of Onset    Cancer Mother         STOMACH    Heart Attack Father     Coronary Art Dis Father     High Blood Pressure Father     High Cholesterol Father     Other Father         CHF    Heart Attack Brother     Cancer Brother         LIVER    Coronary Art Dis Brother     High Blood Pressure Brother     Stroke Brother     High Cholesterol Brother     Other Brother         CHF     Social History     Socioeconomic History    Marital status:      Spouse name: Not on file    Number of children: Not on file    Years of education: Not on file    Highest education level: Not on file   Occupational History    Not on file   Tobacco Use    Smoking status: Former Smoker Packs/day: 2.00     Years: 35.00     Pack years: 70.00     Types: Cigarettes     Start date: 1960     Quit date: 1995     Years since quittin.3    Smokeless tobacco: Never Used   Vaping Use    Vaping Use: Never used   Substance and Sexual Activity    Alcohol use: Yes     Comment: BEER OCCASIONALLY    Drug use: Never    Sexual activity: Not on file   Other Topics Concern    Not on file   Social History Narrative    Not on file     Social Determinants of Health     Financial Resource Strain: Low Risk     Difficulty of Paying Living Expenses: Not hard at all   Food Insecurity: No Food Insecurity    Worried About Running Out of Food in the Last Year: Never true    Regino of Food in the Last Year: Never true   Transportation Needs:     Lack of Transportation (Medical): Not on file    Lack of Transportation (Non-Medical):  Not on file   Physical Activity:     Days of Exercise per Week: Not on file    Minutes of Exercise per Session: Not on file   Stress:     Feeling of Stress : Not on file   Social Connections:     Frequency of Communication with Friends and Family: Not on file    Frequency of Social Gatherings with Friends and Family: Not on file    Attends [employer] Services: Not on file    Active Member of [address] [place] [address] [employer] or Organizations: Not on file    Attends Club or Organization Meetings: Not on file    Marital Status: Not on file   Intimate Partner Violence:     Fear of Current or Ex-Partner: Not on file    Emotionally Abused: Not on file    Physically Abused: Not on file    Sexually Abused: Not on file   Housing Stability:     Unable to Pay for Housing in the Last Year: Not on file    Number of [place] in the Last Year: Not on file    Unstable Housing in the Last Year: Not on file       Vitals:    22 1348   BP: 138/78   Site: Left Upper Arm   Position: Sitting   Cuff Size: Medium Adult   Pulse: 90   Temp: 97.8 °F (36.6 °C)   TempSrc: Temporal   SpO2: 99%   Weight: 280 lb 4.8 oz (127.1 kg)   Height: 5' 9\" (1.753 m)       Physical Exam  Vitals and nursing note reviewed. Constitutional:       Appearance: Normal appearance. HENT:      Head: Normocephalic. Right Ear: Tympanic membrane and ear canal normal. There is no impacted cerumen. Left Ear: Tympanic membrane and ear canal normal. There is no impacted cerumen. Nose: Nose normal.      Mouth/Throat:      Mouth: Mucous membranes are dry. Eyes:      Extraocular Movements: Extraocular movements intact. Pupils: Pupils are equal, round, and reactive to light. Neck:      Vascular: No carotid bruit. Cardiovascular:      Rate and Rhythm: Normal rate and regular rhythm. Pulses: Normal pulses. Heart sounds: Normal heart sounds. No murmur heard. No friction rub. No gallop. Pulmonary:      Effort: Pulmonary effort is normal. No respiratory distress. Breath sounds: Normal breath sounds. No stridor. No wheezing, rhonchi or rales. Chest:      Chest wall: No tenderness. Abdominal:      General: Bowel sounds are normal. There is no distension. Palpations: Abdomen is soft. Musculoskeletal:         General: No swelling, tenderness, deformity or signs of injury. Cervical back: No rigidity. No muscular tenderness. Right lower leg: No edema. Left lower leg: No edema. Lymphadenopathy:      Cervical: No cervical adenopathy. Skin:     General: Skin is warm and dry. Capillary Refill: Capillary refill takes 2 to 3 seconds. Findings: No bruising, lesion or rash. Neurological:      General: No focal deficit present. Mental Status: He is alert and oriented to person, place, and time. Motor: No weakness. Gait: Gait normal.   Psychiatric:         Attention and Perception: Attention normal.         Mood and Affect: Mood normal.         Behavior: Behavior normal.         Thought Content: Thought content does not include homicidal or suicidal ideation.  Thought content does not include homicidal or suicidal plan.        Visual inspection:  Deformity/amputation: absent  Skin lesions/pre-ulcerative calluses: absent  Edema: right- negative, left- negative    Sensory exam:  Monofilament sensation: normal  (minimum of 5 random plantar locations tested, avoiding callused areas - > 1 area with absence of sensation is + for neuropathy)    Plus at least one of the following:  Pulses: normal,   Pinprick: Intact         Seen By:  HENRRY Bear - CNP

## 2022-06-21 NOTE — PROGRESS NOTES
Sasha Fitzgerald (:  1950) is a 67 y.o. male,Established patient, here for evaluation of the following chief complaint(s):  Dysuria (pain with urination, he believes there was a little blood the one time, started last thursday but it has gotten worse, urgency )         ASSESSMENT/PLAN:  1. Dysuria  -     POCT Urinalysis no Micro  -     Culture, Urine; Future  2. Type 2 diabetes mellitus without complication, without long-term current use of insulin ([facility] [address]  3. Essential hypertension  4. Familial hypercholesterolemia  5. History of kidney stones      Return if symptoms worsen or fail to improve. Subjective   SUBJECTIVE/OBJECTIVE:      Chief Complaint:  Dysuria (pain with urination, he believes there was a little blood the one time, started last thursday but it has gotten worse, urgency )      History of Present Illness  Source of history provided by:  patient. Sasha Fitzgerald is a 67 y.o. old male who presents to [facility] care for dysuria, which occured 2 day(s) prior to arrival. Symptoms are associated with hematuria. She has a history of no complicating symptoms. Denies gross hematuria. No associated flank pain. Denies any fever, chills, vaginal discharge, vaginal bleeding, possibility of pregnancy, vomiting, diarrhea, or lethargy. No LMP for male patient. Review of Systems  Unless otherwise stated in this report or unable to obtain because of the patient's clinical or mental status as evidenced by the medical record, this patients's positive and negative responses for Review of Systems, constitutional, psych, eyes, ENT, cardiovascular, respiratory, gastrointestinal, neurological, genitourinary, musculoskeletal, integument systems and systems related to the presenting problem are either stated in the preceding or were not pertinent or were negative for the symptoms and/or complaints related to the medical problem.     Past Medical History:  has a past medical history of CAD (coronary artery disease), Diabetes mellitus ([facility] [address] Hyperlipidemia, Hypertension, and Myocardial infarction ([facility] [address] Past Surgical History:  has a past surgical history that includes Cataract removal (Bilateral); Knee arthroscopy (Bilateral, 09/02/2016); Coronary artery bypass graft (12/01/1997); and Carotid stent placement. Social History:  reports that he quit smoking about 26 years ago. His smoking use included cigarettes. He started smoking about 62 years ago. He has a 70.00 pack-year smoking history. He has never used smokeless tobacco. He reports current alcohol use. He reports that he does not use drugs. Family History: family history includes Cancer in his brother and mother; Coronary Art Dis in his brother and father; Heart Attack in his brother and father; High Blood Pressure in his brother and father; High Cholesterol in his brother and father; Other in his brother and father; Stroke in his brother. Allergies: Patient has no known allergies. Physical Exam  Vital Signs:  BP (!) 146/82 (Site: Left Upper Arm, Position: Sitting, Cuff Size: Medium Adult)   Pulse (!) 104   Temp 96.8 °F (36 °C) (Temporal)   Ht 5' 9\" (1.753 m)   Wt 277 lb 11.2 oz (126 kg)   SpO2 97%   BMI 41.01 kg/m²    Oxygen Saturation Interpretation: Normal.    Constitutional:  A&Ox3, development consistent with age, NAD. Lungs:  CTAB without wheezing, rales, or rhonchi. Heart:  RRR without pathologic murmurs, rubs, or gallops. Abdomen: Soft, nondistended, with mild suprapubic tenderness. No rebound, rigidity, or guarding. BS+ X4. No organomegaly. Back: Neg CVA tenderness. Skin:  Normal turgor. Warm, dry, without visible rash, unless noted elsewhere. Neurological:  Alert and oriented. Motor functions intact. Responds to verbal commands.     Test Results Section  (All laboratory and radiology results have been personally reviewed by myself)  Labs:  Results for orders placed or performed in visit on 06/21/22  -POCT Urinalysis no Micro:        Result                                            Value                         Ref Range                       Color, UA                                                                                                       Clarity, UA                                                                                                     Glucose, UA POC                                   Negative                                                      Bilirubin, UA                                     Negative                                                      Ketones, UA                                       Negative                                                      Spec Grav, UA                                     1.030                                                         Blood, UA POC                                     Large                                                         pH, UA                                            6.5                                                           Protein, UA POC                                   300 mg/dL                                                     Urobilinogen, UA                                  1.0 E.U./dL                                                   Leukocytes, UA                                    Negative                                                      Nitrite, UA                                       Negative                                                   Imaging: All Radiology results interpreted by Radiologist unless otherwise noted. No results found. Medical Decision Making  Patient is well appearing, non toxic and appropriate for outpatient management. Plan is for symptom management and PCP follow up. Assessment / Plan    Impression(s): Anastacia Alcantar was seen today for dysuria. Diagnoses and associated orders for this visit:     Dysuria   POCT Urinalysis no Micro   Culture, Urine;  Future     Type 2 diabetes mellitus without complication, without long-term current use of insulin ([facility] [address]     Essential hypertension     Familial hypercholesterolemia     History of kidney stones     Other orders   sulfamethoxazole-trimethoprim (BACTRIM DS;SEPTRA DS) 800-160 MG per tablet; Take 1 tablet by mouth 2 times daily for 7 days   phenazopyridine (PYRIDIUM) 100 MG tablet; Take 1 tablet by mouth 3 times daily as needed for Pain      UA appears Neg for a UTI. Urine C&S pending, will call with results once available. Script written for Bactrim and Pyridium,, side effects discussed. Increase fluids and rest. F/u PCP in 3-5 days if symptoms persist. ED sooner if symptoms worsen or change. ED immediately with the development of fever, shaking chills, body aches, flank pain, vomiting, CP, or SOB. Pt is in agreement with this care plan. All questions answered. No follow-ups on file. HENRRY Ramesh - CNP      Review of Systems       Objective   Physical Exam       On this date 6/21/2022 I have spent 15 minutes reviewing previous notes, test results and face to face with the patient discussing the diagnosis and importance of compliance with the treatment plan as well as documenting on the day of the visit. An electronic signature was used to authenticate this note.         --HENRRY Ramesh - CNP